# Patient Record
Sex: MALE | Race: BLACK OR AFRICAN AMERICAN | NOT HISPANIC OR LATINO | URBAN - METROPOLITAN AREA
[De-identification: names, ages, dates, MRNs, and addresses within clinical notes are randomized per-mention and may not be internally consistent; named-entity substitution may affect disease eponyms.]

---

## 2023-11-11 ENCOUNTER — EMERGENCY (EMERGENCY)
Facility: HOSPITAL | Age: 43
LOS: 1 days | Discharge: HOME | End: 2023-11-11
Attending: EMERGENCY MEDICINE | Admitting: EMERGENCY MEDICINE
Payer: COMMERCIAL

## 2023-11-11 VITALS
SYSTOLIC BLOOD PRESSURE: 140 MMHG | TEMPERATURE: 98 F | DIASTOLIC BLOOD PRESSURE: 101 MMHG | OXYGEN SATURATION: 100 % | HEART RATE: 98 BPM

## 2023-11-11 LAB
ALBUMIN SERPL ELPH-MCNC: 3.9 G/DL — SIGNIFICANT CHANGE UP (ref 3.3–5)
ALBUMIN SERPL ELPH-MCNC: 3.9 G/DL — SIGNIFICANT CHANGE UP (ref 3.3–5)
ALP SERPL-CCNC: 149 U/L — HIGH (ref 40–120)
ALP SERPL-CCNC: 149 U/L — HIGH (ref 40–120)
ALT FLD-CCNC: 14 U/L — SIGNIFICANT CHANGE UP (ref 4–41)
ALT FLD-CCNC: 14 U/L — SIGNIFICANT CHANGE UP (ref 4–41)
ANION GAP SERPL CALC-SCNC: 11 MMOL/L — SIGNIFICANT CHANGE UP (ref 7–14)
ANION GAP SERPL CALC-SCNC: 11 MMOL/L — SIGNIFICANT CHANGE UP (ref 7–14)
AST SERPL-CCNC: 20 U/L — SIGNIFICANT CHANGE UP (ref 4–40)
AST SERPL-CCNC: 20 U/L — SIGNIFICANT CHANGE UP (ref 4–40)
BASOPHILS # BLD AUTO: 0.02 K/UL — SIGNIFICANT CHANGE UP (ref 0–0.2)
BASOPHILS # BLD AUTO: 0.02 K/UL — SIGNIFICANT CHANGE UP (ref 0–0.2)
BASOPHILS NFR BLD AUTO: 0.3 % — SIGNIFICANT CHANGE UP (ref 0–2)
BASOPHILS NFR BLD AUTO: 0.3 % — SIGNIFICANT CHANGE UP (ref 0–2)
BILIRUB SERPL-MCNC: 0.8 MG/DL — SIGNIFICANT CHANGE UP (ref 0.2–1.2)
BILIRUB SERPL-MCNC: 0.8 MG/DL — SIGNIFICANT CHANGE UP (ref 0.2–1.2)
BUN SERPL-MCNC: 12 MG/DL — SIGNIFICANT CHANGE UP (ref 7–23)
BUN SERPL-MCNC: 12 MG/DL — SIGNIFICANT CHANGE UP (ref 7–23)
CALCIUM SERPL-MCNC: 9 MG/DL — SIGNIFICANT CHANGE UP (ref 8.4–10.5)
CALCIUM SERPL-MCNC: 9 MG/DL — SIGNIFICANT CHANGE UP (ref 8.4–10.5)
CHLORIDE SERPL-SCNC: 105 MMOL/L — SIGNIFICANT CHANGE UP (ref 98–107)
CHLORIDE SERPL-SCNC: 105 MMOL/L — SIGNIFICANT CHANGE UP (ref 98–107)
CO2 SERPL-SCNC: 23 MMOL/L — SIGNIFICANT CHANGE UP (ref 22–31)
CO2 SERPL-SCNC: 23 MMOL/L — SIGNIFICANT CHANGE UP (ref 22–31)
CREAT SERPL-MCNC: 0.92 MG/DL — SIGNIFICANT CHANGE UP (ref 0.5–1.3)
CREAT SERPL-MCNC: 0.92 MG/DL — SIGNIFICANT CHANGE UP (ref 0.5–1.3)
EGFR: 105 ML/MIN/1.73M2 — SIGNIFICANT CHANGE UP
EGFR: 105 ML/MIN/1.73M2 — SIGNIFICANT CHANGE UP
EOSINOPHIL # BLD AUTO: 0.06 K/UL — SIGNIFICANT CHANGE UP (ref 0–0.5)
EOSINOPHIL # BLD AUTO: 0.06 K/UL — SIGNIFICANT CHANGE UP (ref 0–0.5)
EOSINOPHIL NFR BLD AUTO: 0.9 % — SIGNIFICANT CHANGE UP (ref 0–6)
EOSINOPHIL NFR BLD AUTO: 0.9 % — SIGNIFICANT CHANGE UP (ref 0–6)
GLUCOSE SERPL-MCNC: 81 MG/DL — SIGNIFICANT CHANGE UP (ref 70–99)
GLUCOSE SERPL-MCNC: 81 MG/DL — SIGNIFICANT CHANGE UP (ref 70–99)
HCT VFR BLD CALC: 43.6 % — SIGNIFICANT CHANGE UP (ref 39–50)
HCT VFR BLD CALC: 43.6 % — SIGNIFICANT CHANGE UP (ref 39–50)
HGB BLD-MCNC: 13.4 G/DL — SIGNIFICANT CHANGE UP (ref 13–17)
HGB BLD-MCNC: 13.4 G/DL — SIGNIFICANT CHANGE UP (ref 13–17)
IANC: 4.91 K/UL — SIGNIFICANT CHANGE UP (ref 1.8–7.4)
IANC: 4.91 K/UL — SIGNIFICANT CHANGE UP (ref 1.8–7.4)
IMM GRANULOCYTES NFR BLD AUTO: 0.3 % — SIGNIFICANT CHANGE UP (ref 0–0.9)
IMM GRANULOCYTES NFR BLD AUTO: 0.3 % — SIGNIFICANT CHANGE UP (ref 0–0.9)
LYMPHOCYTES # BLD AUTO: 0.82 K/UL — LOW (ref 1–3.3)
LYMPHOCYTES # BLD AUTO: 0.82 K/UL — LOW (ref 1–3.3)
LYMPHOCYTES # BLD AUTO: 12.6 % — LOW (ref 13–44)
LYMPHOCYTES # BLD AUTO: 12.6 % — LOW (ref 13–44)
MCHC RBC-ENTMCNC: 24 PG — LOW (ref 27–34)
MCHC RBC-ENTMCNC: 24 PG — LOW (ref 27–34)
MCHC RBC-ENTMCNC: 30.7 GM/DL — LOW (ref 32–36)
MCHC RBC-ENTMCNC: 30.7 GM/DL — LOW (ref 32–36)
MCV RBC AUTO: 78.1 FL — LOW (ref 80–100)
MCV RBC AUTO: 78.1 FL — LOW (ref 80–100)
MONOCYTES # BLD AUTO: 0.69 K/UL — SIGNIFICANT CHANGE UP (ref 0–0.9)
MONOCYTES # BLD AUTO: 0.69 K/UL — SIGNIFICANT CHANGE UP (ref 0–0.9)
MONOCYTES NFR BLD AUTO: 10.6 % — SIGNIFICANT CHANGE UP (ref 2–14)
MONOCYTES NFR BLD AUTO: 10.6 % — SIGNIFICANT CHANGE UP (ref 2–14)
NEUTROPHILS # BLD AUTO: 4.91 K/UL — SIGNIFICANT CHANGE UP (ref 1.8–7.4)
NEUTROPHILS # BLD AUTO: 4.91 K/UL — SIGNIFICANT CHANGE UP (ref 1.8–7.4)
NEUTROPHILS NFR BLD AUTO: 75.3 % — SIGNIFICANT CHANGE UP (ref 43–77)
NEUTROPHILS NFR BLD AUTO: 75.3 % — SIGNIFICANT CHANGE UP (ref 43–77)
NRBC # BLD: 0 /100 WBCS — SIGNIFICANT CHANGE UP (ref 0–0)
NRBC # BLD: 0 /100 WBCS — SIGNIFICANT CHANGE UP (ref 0–0)
NRBC # FLD: 0 K/UL — SIGNIFICANT CHANGE UP (ref 0–0)
NRBC # FLD: 0 K/UL — SIGNIFICANT CHANGE UP (ref 0–0)
PLATELET # BLD AUTO: 237 K/UL — SIGNIFICANT CHANGE UP (ref 150–400)
PLATELET # BLD AUTO: 237 K/UL — SIGNIFICANT CHANGE UP (ref 150–400)
POTASSIUM SERPL-MCNC: 4.3 MMOL/L — SIGNIFICANT CHANGE UP (ref 3.5–5.3)
POTASSIUM SERPL-MCNC: 4.3 MMOL/L — SIGNIFICANT CHANGE UP (ref 3.5–5.3)
POTASSIUM SERPL-SCNC: 4.3 MMOL/L — SIGNIFICANT CHANGE UP (ref 3.5–5.3)
POTASSIUM SERPL-SCNC: 4.3 MMOL/L — SIGNIFICANT CHANGE UP (ref 3.5–5.3)
PROT SERPL-MCNC: 8.1 G/DL — SIGNIFICANT CHANGE UP (ref 6–8.3)
PROT SERPL-MCNC: 8.1 G/DL — SIGNIFICANT CHANGE UP (ref 6–8.3)
RBC # BLD: 5.58 M/UL — SIGNIFICANT CHANGE UP (ref 4.2–5.8)
RBC # BLD: 5.58 M/UL — SIGNIFICANT CHANGE UP (ref 4.2–5.8)
RBC # FLD: 17.2 % — HIGH (ref 10.3–14.5)
RBC # FLD: 17.2 % — HIGH (ref 10.3–14.5)
SODIUM SERPL-SCNC: 139 MMOL/L — SIGNIFICANT CHANGE UP (ref 135–145)
SODIUM SERPL-SCNC: 139 MMOL/L — SIGNIFICANT CHANGE UP (ref 135–145)
WBC # BLD: 6.52 K/UL — SIGNIFICANT CHANGE UP (ref 3.8–10.5)
WBC # BLD: 6.52 K/UL — SIGNIFICANT CHANGE UP (ref 3.8–10.5)
WBC # FLD AUTO: 6.52 K/UL — SIGNIFICANT CHANGE UP (ref 3.8–10.5)
WBC # FLD AUTO: 6.52 K/UL — SIGNIFICANT CHANGE UP (ref 3.8–10.5)

## 2023-11-11 PROCEDURE — 99284 EMERGENCY DEPT VISIT MOD MDM: CPT

## 2023-11-11 RX ORDER — LIDOCAINE HCL 20 MG/ML
5 VIAL (ML) INJECTION ONCE
Refills: 0 | Status: DISCONTINUED | OUTPATIENT
Start: 2023-11-11 | End: 2023-11-14

## 2023-11-11 NOTE — ED PROVIDER NOTE - PATIENT PORTAL LINK FT
You can access the FollowMyHealth Patient Portal offered by [facility] by registering at the following website: [email or web address]. By joining [facility] [facility]’s FollowMyHealth portal, you will also be able to view your health information using other applications (apps) compatible with our system.

## 2023-11-11 NOTE — ED ADULT NURSE NOTE - OBJECTIVE STATEMENT
Pt received to int9 , awake and alert, A&OX4, ambulatory. C/o rectal bleeding since last night. One episode of bloody stools this morning but states the other episodes were without passing stool while sitting on the toilet.  Respirations even and unlabored. Denies CP, SOB, N/V, HA, dizziness, palpitations, blurry vision. 20G IV placed to  L AC. Bed in lowest position, call bell within reach. Safety maintained.

## 2023-11-11 NOTE — ED PROVIDER NOTE - OBJECTIVE STATEMENT
43-year-old male PMH hydradenitis suppurativa presenting with rectal bleeding.  Patient states he had bloody stools on Friday at 10 PM Saturday 12 AM 1 AM 3 AM.  At 7:30 AM he had soft nonbloody stools.  Patient denies abdominal pain nausea vomiting sick contacts.  The patient states he has a history of GI bleeding a few months ago.  The patient has not seen anyone for the GI bleeding.  The patient has a history of colonoscopy 7 years ago he is unsure of the reason why he had a colonoscopy but it was recommended.  The patient also states he had pinkeye in October 25 which was diagnosed at urgent care and given antibiotics.  He then follow-up with an ophthalmologist who said he had eye irritation and switched his medications.  On Thursday he had eye pressure and took 2 Tylenols.  Repeated took 2 Tylenols on Friday which she thinks might of caused the GI bleeding.

## 2023-11-11 NOTE — ED ADULT TRIAGE NOTE - CHIEF COMPLAINT QUOTE
Pt c/o rectal bleed  with clots since last night.  Pt took  2 morris asp  2 days ago.  Pt dx with pink  eye on 11/25 , prescribed  eye drops with no relief, now c/o eye pressure.  Denies dizziness, rectal bleed

## 2023-11-11 NOTE — ED PROVIDER NOTE - ATTENDING CONTRIBUTION TO CARE
rectal bleeding could be 2/2 local oozing from hydradenitis internal hemorrhoids or higher source VSS could be higher source but colonoscopy  7 y ago negative so good prognostic sign will d/c with f/u GI and RTED PRN

## 2023-11-11 NOTE — ED PROVIDER NOTE - PHYSICAL EXAMINATION
GENERAL: NAD, lying in bed comfortably  HEAD:  Atraumatic, normocephalic  EYES: EOMI, PERRLA, conjunctiva and sclera clear  NECK: Supple, trachea midline, no JVD  HEART: Regular rate and rhythm, no murmurs, rubs, or gallops  LUNGS: Unlabored respirations.  Clear to auscultation bilaterally, no crackles, wheezing, or rhonchi  ABDOMEN: Soft, nontender, nondistended, +BS  Rectal exam: Chaperone Rafiq Yusuf and Bianca Acosta signs of active hydranitis and andrés blood on glove after rectal exam no palpable hemorrhoids   EXTREMITIES: 2+ peripheral pulses bilaterally. No clubbing, cyanosis, or edema  NERVOUS SYSTEM:  A&Ox3, moving all extremities, no focal deficits   SKIN: No rashes or lesions

## 2023-11-11 NOTE — ED PROVIDER NOTE - NSFOLLOWUPINSTRUCTIONS_ED_ALL_ED_FT
Rectal Bleeding    WHAT YOU NEED TO KNOW:    Rectal bleeding can be caused by constipation, hemorrhoids, or anal fissures. It may also be caused by polyps, tumors, or medical conditions, such as colitis or diverticulitis.    DISCHARGE INSTRUCTIONS:    Medicines:    Pain medicine: You may be given medicine to take away or decrease pain. Do not wait until the pain is severe before you take your medicine.    Iron supplement: Iron helps your body make more red blood cells.    Steroids: This medicine decreases inflammation in your rectum. It may be applied as a cream, ointment, or lotion.    Take your medicine as directed. Contact your healthcare provider if you think your medicine is not helping or if you have side effects. Tell him or her if you are allergic to any medicine. Keep a list of the medicines, vitamins, and herbs you take. Include the amounts, and when and why you take them. Bring the list or the pill bottles to follow-up visits. Carry your medicine list with you in case of an emergency.  Follow up with your healthcare provider as directed: Write down your questions so you remember to ask them during your visits.    Drink liquids as directed: Ask your healthcare provider how much liquid to drink each day and which liquids are best for you. This will help prevent dehydration and constipation.    Contact your healthcare provider if:    You have a fever.    Your rectal bleeding stopped for a time, but has started again.    You have nausea.    You have cold, sweaty, pale skin.    You have changes in your bowel movements, such as diarrhea.    You have questions or concerns about your condition or care.  Seek care immediately or call 911 if:    You are breathing faster than usual.    You are dizzy, lightheaded, or feel faint.    You are confused or cannot think clearly.    You urinate less than usual or not at all.    Your rectal bleeding is constant or heavy.    You have severe abdominal pain or cramping.

## 2024-03-02 ENCOUNTER — INPATIENT (INPATIENT)
Facility: HOSPITAL | Age: 44
LOS: 2 days | Discharge: HOME | End: 2024-03-05
Attending: STUDENT IN AN ORGANIZED HEALTH CARE EDUCATION/TRAINING PROGRAM | Admitting: STUDENT IN AN ORGANIZED HEALTH CARE EDUCATION/TRAINING PROGRAM
Payer: COMMERCIAL

## 2024-03-02 VITALS
TEMPERATURE: 98 F | OXYGEN SATURATION: 100 % | DIASTOLIC BLOOD PRESSURE: 80 MMHG | HEART RATE: 108 BPM | RESPIRATION RATE: 18 BRPM | SYSTOLIC BLOOD PRESSURE: 114 MMHG

## 2024-03-02 DIAGNOSIS — K92.2 GASTROINTESTINAL HEMORRHAGE, UNSPECIFIED: ICD-10-CM

## 2024-03-02 PROBLEM — L73.2 HIDRADENITIS SUPPURATIVA: Chronic | Status: ACTIVE | Noted: 2023-11-11

## 2024-03-02 LAB
ALBUMIN SERPL ELPH-MCNC: 3.3 G/DL — SIGNIFICANT CHANGE UP (ref 3.3–5)
ALP SERPL-CCNC: 129 U/L — HIGH (ref 40–120)
ALT FLD-CCNC: 19 U/L — SIGNIFICANT CHANGE UP (ref 4–41)
ANION GAP SERPL CALC-SCNC: 8 MMOL/L — SIGNIFICANT CHANGE UP (ref 7–14)
APTT BLD: 29.3 SEC — SIGNIFICANT CHANGE UP (ref 24.5–35.6)
AST SERPL-CCNC: 17 U/L — SIGNIFICANT CHANGE UP (ref 4–40)
BASE EXCESS BLDV CALC-SCNC: 1 MMOL/L — SIGNIFICANT CHANGE UP (ref -2–3)
BASOPHILS # BLD AUTO: 0.01 K/UL — SIGNIFICANT CHANGE UP (ref 0–0.2)
BASOPHILS NFR BLD AUTO: 0.1 % — SIGNIFICANT CHANGE UP (ref 0–2)
BILIRUB SERPL-MCNC: 0.3 MG/DL — SIGNIFICANT CHANGE UP (ref 0.2–1.2)
BLD GP AB SCN SERPL QL: NEGATIVE — SIGNIFICANT CHANGE UP
BLOOD GAS VENOUS COMPREHENSIVE RESULT: SIGNIFICANT CHANGE UP
BUN SERPL-MCNC: 12 MG/DL — SIGNIFICANT CHANGE UP (ref 7–23)
CALCIUM SERPL-MCNC: 8.6 MG/DL — SIGNIFICANT CHANGE UP (ref 8.4–10.5)
CHLORIDE BLDV-SCNC: 107 MMOL/L — SIGNIFICANT CHANGE UP (ref 96–108)
CHLORIDE SERPL-SCNC: 106 MMOL/L — SIGNIFICANT CHANGE UP (ref 98–107)
CO2 BLDV-SCNC: 28 MMOL/L — HIGH (ref 22–26)
CO2 SERPL-SCNC: 24 MMOL/L — SIGNIFICANT CHANGE UP (ref 22–31)
CREAT SERPL-MCNC: 0.89 MG/DL — SIGNIFICANT CHANGE UP (ref 0.5–1.3)
EGFR: 108 ML/MIN/1.73M2 — SIGNIFICANT CHANGE UP
EOSINOPHIL # BLD AUTO: 0.06 K/UL — SIGNIFICANT CHANGE UP (ref 0–0.5)
EOSINOPHIL NFR BLD AUTO: 0.8 % — SIGNIFICANT CHANGE UP (ref 0–6)
GAS PNL BLDV: 137 MMOL/L — SIGNIFICANT CHANGE UP (ref 136–145)
GLUCOSE BLDV-MCNC: 85 MG/DL — SIGNIFICANT CHANGE UP (ref 70–99)
GLUCOSE SERPL-MCNC: 84 MG/DL — SIGNIFICANT CHANGE UP (ref 70–99)
HCO3 BLDV-SCNC: 27 MMOL/L — SIGNIFICANT CHANGE UP (ref 22–29)
HCT VFR BLD CALC: 31.5 % — LOW (ref 39–50)
HCT VFR BLD CALC: 31.6 % — LOW (ref 39–50)
HCT VFR BLDA CALC: 31 % — LOW (ref 39–51)
HGB BLD CALC-MCNC: 10.2 G/DL — LOW (ref 12.6–17.4)
HGB BLD-MCNC: 9.4 G/DL — LOW (ref 13–17)
HGB BLD-MCNC: 9.6 G/DL — LOW (ref 13–17)
HIV 1+2 AB+HIV1 P24 AG SERPL QL IA: SIGNIFICANT CHANGE UP
IANC: 5.79 K/UL — SIGNIFICANT CHANGE UP (ref 1.8–7.4)
IMM GRANULOCYTES NFR BLD AUTO: 0.3 % — SIGNIFICANT CHANGE UP (ref 0–0.9)
INR BLD: 1.33 RATIO — HIGH (ref 0.85–1.18)
LACTATE BLDV-MCNC: 0.9 MMOL/L — SIGNIFICANT CHANGE UP (ref 0.5–2)
LYMPHOCYTES # BLD AUTO: 0.95 K/UL — LOW (ref 1–3.3)
LYMPHOCYTES # BLD AUTO: 12.5 % — LOW (ref 13–44)
MCHC RBC-ENTMCNC: 21.4 PG — LOW (ref 27–34)
MCHC RBC-ENTMCNC: 21.5 PG — LOW (ref 27–34)
MCHC RBC-ENTMCNC: 29.8 GM/DL — LOW (ref 32–36)
MCHC RBC-ENTMCNC: 30.4 GM/DL — LOW (ref 32–36)
MCV RBC AUTO: 70.4 FL — LOW (ref 80–100)
MCV RBC AUTO: 71.9 FL — LOW (ref 80–100)
MONOCYTES # BLD AUTO: 0.75 K/UL — SIGNIFICANT CHANGE UP (ref 0–0.9)
MONOCYTES NFR BLD AUTO: 9.9 % — SIGNIFICANT CHANGE UP (ref 2–14)
NEUTROPHILS # BLD AUTO: 5.79 K/UL — SIGNIFICANT CHANGE UP (ref 1.8–7.4)
NEUTROPHILS NFR BLD AUTO: 76.4 % — SIGNIFICANT CHANGE UP (ref 43–77)
NRBC # BLD: 0 /100 WBCS — SIGNIFICANT CHANGE UP (ref 0–0)
NRBC # BLD: 0 /100 WBCS — SIGNIFICANT CHANGE UP (ref 0–0)
NRBC # FLD: 0 K/UL — SIGNIFICANT CHANGE UP (ref 0–0)
NRBC # FLD: 0 K/UL — SIGNIFICANT CHANGE UP (ref 0–0)
PCO2 BLDV: 46 MMHG — SIGNIFICANT CHANGE UP (ref 42–55)
PH BLDV: 7.37 — SIGNIFICANT CHANGE UP (ref 7.32–7.43)
PLATELET # BLD AUTO: 341 K/UL — SIGNIFICANT CHANGE UP (ref 150–400)
PLATELET # BLD AUTO: 345 K/UL — SIGNIFICANT CHANGE UP (ref 150–400)
PO2 BLDV: 32 MMHG — SIGNIFICANT CHANGE UP (ref 25–45)
POTASSIUM BLDV-SCNC: 4.4 MMOL/L — SIGNIFICANT CHANGE UP (ref 3.5–5.1)
POTASSIUM SERPL-MCNC: 4.4 MMOL/L — SIGNIFICANT CHANGE UP (ref 3.5–5.3)
POTASSIUM SERPL-SCNC: 4.4 MMOL/L — SIGNIFICANT CHANGE UP (ref 3.5–5.3)
PROT SERPL-MCNC: 7.7 G/DL — SIGNIFICANT CHANGE UP (ref 6–8.3)
PROTHROM AB SERPL-ACNC: 14.9 SEC — HIGH (ref 9.5–13)
RBC # BLD: 4.38 M/UL — SIGNIFICANT CHANGE UP (ref 4.2–5.8)
RBC # BLD: 4.49 M/UL — SIGNIFICANT CHANGE UP (ref 4.2–5.8)
RBC # FLD: 16.9 % — HIGH (ref 10.3–14.5)
RBC # FLD: 16.9 % — HIGH (ref 10.3–14.5)
RH IG SCN BLD-IMP: POSITIVE — SIGNIFICANT CHANGE UP
RH IG SCN BLD-IMP: POSITIVE — SIGNIFICANT CHANGE UP
SAO2 % BLDV: 50.2 % — LOW (ref 67–88)
SODIUM SERPL-SCNC: 138 MMOL/L — SIGNIFICANT CHANGE UP (ref 135–145)
WBC # BLD: 7.45 K/UL — SIGNIFICANT CHANGE UP (ref 3.8–10.5)
WBC # BLD: 7.58 K/UL — SIGNIFICANT CHANGE UP (ref 3.8–10.5)
WBC # FLD AUTO: 7.45 K/UL — SIGNIFICANT CHANGE UP (ref 3.8–10.5)
WBC # FLD AUTO: 7.58 K/UL — SIGNIFICANT CHANGE UP (ref 3.8–10.5)

## 2024-03-02 PROCEDURE — 99291 CRITICAL CARE FIRST HOUR: CPT

## 2024-03-02 PROCEDURE — 74177 CT ABD & PELVIS W/CONTRAST: CPT | Mod: 26,MC

## 2024-03-02 NOTE — ED PROVIDER NOTE - ATTENDING CONTRIBUTION TO CARE
I have personally performed a face to face medical and diagnostic evaluation of the patient. I have discussed with and reviewed the Resident's note and agree with the History, ROS, Physical Exam and MDM unless otherwise indicated. A brief summary of my personal evaluation and impression can be found below.    Pia QUINTANILLA:   43-year-old male history of hidradenitis, presents with a chief complaint of rectal bleeding, patient reports about 4-5 episodes today of bright red blood per rectum with fullness sensation to his lower abdomen just before he defecates.  Reports had a similar episode of this in November, was seen in the ER was discharged, was instructed to follow-up with GI outpatient was unable to do so.  Patient reports feeling mildly lightheaded and dizzy and a little weak.   has not had interval bleeding since being seen in November.  Can move everything and feel everything.  Denies bleeding elsewhere.  No chest pain no trouble breathing no nausea vomiting.  No abdominal pain.    All other ROS negative, except as above and as per HPI and ROS section.    VITALS: Initial triage and subsequent vitals have been reviewed by me.  GEN APPEARANCE: Alert, non-toxic, well-appearing, NAD.  HEAD: Atraumatic.  EYES: PERRLa, EOMI, vision grossly intact.   NECK: Supple  CV: RRR, S1S2, no c/r/m/g. Cap refill <2 seconds. No bruits.   LUNGS: CTAB. No abnormal breath sounds.  ABDOMEN: Soft, NTND. No guarding or rebound.   MSK/EXT: No spinal or extremity point tenderness. No CVA ttp. Pelvis stable. No peripheral edema.  NEURO: Alert, follows commands. Weight bearing normal. Speech normal. Sensation and motor normal x4 extremities.   SKIN: Warm, dry and intact. No rash.  PSYCH: Appropriate    Plan/MDM: Exam vitals are stable nontoxic-appearing physical exam as above, DDx and CT rectal exam documented as resident as above, concern for possible diverticulitis versus possible colon/rectal CA as etiology of bleeding, no obvious hemorrhoids on exam, may also be sequela of undertreated STD, will check basic labs type and screen STD screening urine CT abdomen pelvis give meds as needed and reassess, will transfuse as indicated, consider GI consult as indicated, consider possible admission.

## 2024-03-02 NOTE — ED PROVIDER NOTE - PHYSICAL EXAMINATION
PHYSICAL EXAM:  CONSTITUTIONAL: No acute distress  HEAD: Atraumatic  EYES: conjunctival pallor  ENMT: Airway patent, Nasal mucosa clear. Mouth with normal mucosa. Uvula is midline.   CARDIAC:  mild tachycardia without appreciable murmur  RESPIRATORY: Breathing unlabored. Breath sounds clear and equal bilaterally.  ABDOMEN: Soft, nontender, nondistended. No rebound tenderness or guarding.\  Rectal: no blood on glove or active bleeding noted,  Circumferential firm mass,  no external hemorrhoids visualized no internal hemorrhoids palpated  NEUROLOGICAL: Alert and oriented, no focal deficits, no motor or sensory deficits. CN2-12 intact. Sensation intact x4 extremities.

## 2024-03-02 NOTE — PATIENT PROFILE ADULT - FALL HARM RISK - HARM RISK INTERVENTIONS
Assistance with ambulation/Assistance OOB with selected safe patient handling equipment/Communicate Risk of Fall with Harm to all staff/Reinforce activity limits and safety measures with patient and family/Review medications for side effects contributing to fall risk/Sit up slowly, dangle for a short time, stand at bedside before walking/Tailored Fall Risk Interventions/Toileting schedule using arm’s reach rule for commode and bathroom/Visual Cue: Yellow wristband and red socks/Bed in lowest position, wheels locked, appropriate side rails in place/Call bell, personal items and telephone in reach/Instruct patient to call for assistance before getting out of bed or chair/Non-slip footwear when patient is out of bed/[place] to call system/Physically safe environment - no spills, clutter or unnecessary equipment/Purposeful Proactive Rounding/Room/bathroom lighting operational, light cord in reach

## 2024-03-02 NOTE — ED ADULT TRIAGE NOTE - CHIEF COMPLAINT QUOTE
Patient presents to ED for episodes of rectal bleed since 2 AM last night. Last episode 3 hours ago. Per patient, bright red with clots. He reports being seen for similar episode November 2023 and diagnosed with hemorrhoids. Denies CP, SOB, N/V, fevers, chills. No significant past medical history.

## 2024-03-02 NOTE — PATIENT PROFILE ADULT - FUNCTIONAL SCREEN CURRENT LEVEL: COMMUNICATION, MLM
0 = understands/communicates without difficulty Bactrim Counseling:  I discussed with the patient the risks of sulfa antibiotics including but not limited to GI upset, allergic reaction, drug rash, diarrhea, dizziness, photosensitivity, and yeast infections.  Rarely, more serious reactions can occur including but not limited to aplastic anemia, agranulocytosis, methemoglobinemia, blood dyscrasias, liver or kidney failure, lung infiltrates or desquamative/blistering drug rashes.

## 2024-03-02 NOTE — ED PROVIDER NOTE - OBJECTIVE STATEMENT
43-year-old male with history of hidradenitis suppurativa presenting with concern for rectal bleeding.  Patient notes that approximately 1 year prior he was seen for similar rectal bleeding and was diagnosed with hemorrhoids at that time.  It was self resolved.   States that beginning around 2 AM last night he has had about 5 episodes of very large volume bright red blood per rectum and passage of clots.  Patient denies any pain lightheadedness dizziness chest pain shortness of breath.  Is not on any blood thinners or antiplatelet agents.

## 2024-03-02 NOTE — CONSULT NOTE ADULT - SUBJECTIVE AND OBJECTIVE BOX
SURGERY CONSULT NOTE    HPI: 42 YO M hx of HS p/w 1 day of bloody BM. States that he saw clots of blood in his BM this AM. Reports episodes of bloody BM and hemorrhoids earlier in 2023. Denies dizziness SOB nausea or emesis. States last colonoscopy might have been when he was 29 but does not remember why or what the results were. Surgery consulted for GI bleed and noted active extravasation noted on CT scan. Noted gluteal mass vs collection on CT scan.       PAST MEDICAL & SURGICAL HISTORY:  Hydradenitis          MEDICATIONS  (STANDING):    MEDICATIONS  (PRN):      Allergies    No Known Allergies    Intolerances        SOCIAL HISTORY:    FAMILY HISTORY:      Physical Exam:  General: NAD, resting comfortably  HEENT: NC/AT, EOMI, normal hearing, no oral lesions, no LAD, neck supple  Pulmonary: normal resp effort, CTA-B  Cardiovascular: NSR, no murmurs  Abdominal: soft, ND/NT, no organomegaly  gluteus with HS and prior scarring    Vital Signs Last 24 Hrs  T(C): 37.1 (02 Mar 2024 21:40), Max: 37.1 (02 Mar 2024 21:40)  T(F): 98.8 (02 Mar 2024 21:40), Max: 98.8 (02 Mar 2024 21:40)  HR: 91 (02 Mar 2024 21:40) (91 - 108)  BP: 117/70 (02 Mar 2024 21:40) (114/80 - 117/70)  BP(mean): --  RR: 17 (02 Mar 2024 21:40) (17 - 18)  SpO2: 100% (02 Mar 2024 21:40) (100% - 100%)    Parameters below as of 02 Mar 2024 21:40  Patient On (Oxygen Delivery Method): room air        I&O's Summary          LABS:                        9.4    7.45  )-----------( 341      ( 02 Mar 2024 20:46 )             31.5     03-02    138  |  106  |  12  ----------------------------<  84  4.4   |  24  |  0.89    Ca    8.6      02 Mar 2024 17:30    TPro  7.7  /  Alb  3.3  /  TBili  0.3  /  DBili  x   /  AST  17  /  ALT  19  /  AlkPhos  129<H>  03-02    PT/INR - ( 02 Mar 2024 17:30 )   PT: 14.9 sec;   INR: 1.33 ratio         PTT - ( 02 Mar 2024 17:30 )  PTT:29.3 sec  Urinalysis Basic - ( 02 Mar 2024 17:30 )    Color: x / Appearance: x / SG: x / pH: x  Gluc: 84 mg/dL / Ketone: x  / Bili: x / Urobili: x   Blood: x / Protein: x / Nitrite: x   Leuk Esterase: x / RBC: x / WBC x   Sq Epi: x / Non Sq Epi: x / Bacteria: x      CAPILLARY BLOOD GLUCOSE        LIVER FUNCTIONS - ( 02 Mar 2024 17:30 )  Alb: 3.3 g/dL / Pro: 7.7 g/dL / ALK PHOS: 129 U/L / ALT: 19 U/L / AST: 17 U/L / GGT: x             Cultures:      RADIOLOGY & ADDITIONAL STUDIES:  < from: CT Abdomen and Pelvis w/ IV Cont (03.02.24 @ 18:50) >  BOWEL: Evaluation for active bleed is somewhat limited due to technical   error resulting in no precontrast imaging. There is an active bleed   within the mid descending colon and there is surrounding fat stranding as   well as apparent concentration within the medial wall. There is is an   irregular area of masslike enhancement measuring approximately5.2 x 5.4   x 8.7 cm within the left medial gluteal fold (image #117, series   303/axial images and image #80, series 608/sagittal images) extending to   the posterior aspect of the internal sphincter. No internal fluid is   appreciated. There also appears to be a tract within the right medial   gluteal fat that also extends anteriorly toward the sphincter complex   with an apparent drainage track within the superficial medial gluteal   fold (image #109, series 303). Appendix is normal.  PERITONEUM: No ascites.  VESSELS: Within normal limits.  RETROPERITONEUM/LYMPH NODES: No lymphadenopathy.  ABDOMINAL WALL: See above for valvular abnormalities. Otherwise   unremarkable.  BONES: Within normal limits.    IMPRESSION:  Active congestive distal bleed within the mid descending colon with   surrounding fat stranding and there appears to be concentration of   contrast within the medial wall. Further evaluation with colonoscopy is   recommended exclude underlying lesion.    There appears to be a enhancing mass versus collapsed collection within   the left medial gluteal fold that appears to extend to the posterior   aspect of the internal sphincter as well as an apparent tract within the   right medial gluteal fold. This could represent a mass, marked   hidradenitis/enhancing complex perianal/gluteal fistula. Further   evaluation with perianal fistula protocol MRI is recommended.    < end of copied text >        Plan:  42 YO M hx of HS and hemorrhoids p/w 1 day of bloody BM. States that he saw clots of blood in his BM this AM. Reports episodes of bloody BM and hemorrhoids earlier in 2023. Denies dizziness SOB nausea or emesis. States last colonoscopy might have been when he was 29 but does not remember why or what the results were. Surgery consulted for GI bleed and noted active extravasation noted on CT scan at mid descending colon. Noted gluteal mass vs collection as well. Hemodynamically stable at time of encounter with stable HgB 9.4.     - recommend IR consult for possible embolization in setting of active bleed in mid-descending colon  - "gluteal mass" likely secondary to scarring from HS, no induration or WBC suggesting abscess  - recommend GI consult for colonoscopy  - serial CBC and abdominal exam  - transfuse PRN  - no acute surgical intervention at this time, likely more beneficial to attempt more minimally invasive approaches  - surgery to follow    boubacar Florian, surgery attending    B Team, [id number]

## 2024-03-02 NOTE — ED ADULT NURSE NOTE - OBJECTIVE STATEMENT
Pt received to int 2 , awake and alert, A&OX4, ambulatory. C/o rectal bleeding since this morning around 2am with bowel movements only. Denies anticoagulation use, abd pain.  Respirations even and unlabored. Denies CP, SOB, N/V, HA, dizziness, palpitations, blurry vision. 20G IV placed to  R AC Bed in lowest position, call bell within reach. Safety maintained.

## 2024-03-02 NOTE — ED PROVIDER NOTE - CLINICAL SUMMARY MEDICAL DECISION MAKING FREE TEXT BOX
43-year-old male with history of hidradenitis suppurativa presenting with brbpr. Vitals with tachycardai. Exam with concerning rectal mass but without active hemorrhage.  will obtain basics coags type and screen CT abdomen pelvis to evaluate mass and possible active bleed reassess.  May require admission.

## 2024-03-03 ENCOUNTER — TRANSCRIPTION ENCOUNTER (OUTPATIENT)
Age: 44
End: 2024-03-03

## 2024-03-03 DIAGNOSIS — K60.3 ANAL FISTULA: ICD-10-CM

## 2024-03-03 DIAGNOSIS — K62.5 HEMORRHAGE OF ANUS AND RECTUM: ICD-10-CM

## 2024-03-03 DIAGNOSIS — R71.8 OTHER ABNORMALITY OF RED BLOOD CELLS: ICD-10-CM

## 2024-03-03 DIAGNOSIS — Z29.9 ENCOUNTER FOR PROPHYLACTIC MEASURES, UNSPECIFIED: ICD-10-CM

## 2024-03-03 LAB
ADD ON TEST-SPECIMEN IN LAB: SIGNIFICANT CHANGE UP
ANION GAP SERPL CALC-SCNC: 9 MMOL/L — SIGNIFICANT CHANGE UP (ref 7–14)
APTT BLD: 30.7 SEC — SIGNIFICANT CHANGE UP (ref 24.5–35.6)
BLD GP AB SCN SERPL QL: NEGATIVE — SIGNIFICANT CHANGE UP
BUN SERPL-MCNC: 9 MG/DL — SIGNIFICANT CHANGE UP (ref 7–23)
CALCIUM SERPL-MCNC: 8.2 MG/DL — LOW (ref 8.4–10.5)
CHLORIDE SERPL-SCNC: 107 MMOL/L — SIGNIFICANT CHANGE UP (ref 98–107)
CO2 SERPL-SCNC: 24 MMOL/L — SIGNIFICANT CHANGE UP (ref 22–31)
CREAT SERPL-MCNC: 0.81 MG/DL — SIGNIFICANT CHANGE UP (ref 0.5–1.3)
EGFR: 111 ML/MIN/1.73M2 — SIGNIFICANT CHANGE UP
FERRITIN SERPL-MCNC: 88 NG/ML — SIGNIFICANT CHANGE UP (ref 30–400)
GLUCOSE SERPL-MCNC: 93 MG/DL — SIGNIFICANT CHANGE UP (ref 70–99)
HCT VFR BLD CALC: 28.9 % — LOW (ref 39–50)
HCT VFR BLD CALC: 29.5 % — LOW (ref 39–50)
HCT VFR BLD CALC: 31.3 % — LOW (ref 39–50)
HGB BLD-MCNC: 9.1 G/DL — LOW (ref 13–17)
HGB BLD-MCNC: 9.4 G/DL — LOW (ref 13–17)
HGB BLD-MCNC: 9.6 G/DL — LOW (ref 13–17)
INR BLD: 1.27 RATIO — HIGH (ref 0.85–1.18)
IRON SATN MFR SERPL: 13 UG/DL — LOW (ref 45–165)
IRON SATN MFR SERPL: 6 % — LOW (ref 14–50)
MAGNESIUM SERPL-MCNC: 2.2 MG/DL — SIGNIFICANT CHANGE UP (ref 1.6–2.6)
MCHC RBC-ENTMCNC: 21.9 PG — LOW (ref 27–34)
MCHC RBC-ENTMCNC: 22.3 PG — LOW (ref 27–34)
MCHC RBC-ENTMCNC: 22.5 PG — LOW (ref 27–34)
MCHC RBC-ENTMCNC: 30.7 GM/DL — LOW (ref 32–36)
MCHC RBC-ENTMCNC: 31.5 GM/DL — LOW (ref 32–36)
MCHC RBC-ENTMCNC: 31.9 GM/DL — LOW (ref 32–36)
MCV RBC AUTO: 70.6 FL — LOW (ref 80–100)
MCV RBC AUTO: 70.8 FL — LOW (ref 80–100)
MCV RBC AUTO: 71.3 FL — LOW (ref 80–100)
NRBC # BLD: 0 /100 WBCS — SIGNIFICANT CHANGE UP (ref 0–0)
NRBC # FLD: 0 K/UL — SIGNIFICANT CHANGE UP (ref 0–0)
PHOSPHATE SERPL-MCNC: 3 MG/DL — SIGNIFICANT CHANGE UP (ref 2.5–4.5)
PLATELET # BLD AUTO: 278 K/UL — SIGNIFICANT CHANGE UP (ref 150–400)
PLATELET # BLD AUTO: 284 K/UL — SIGNIFICANT CHANGE UP (ref 150–400)
PLATELET # BLD AUTO: 288 K/UL — SIGNIFICANT CHANGE UP (ref 150–400)
POTASSIUM SERPL-MCNC: 3.9 MMOL/L — SIGNIFICANT CHANGE UP (ref 3.5–5.3)
POTASSIUM SERPL-SCNC: 3.9 MMOL/L — SIGNIFICANT CHANGE UP (ref 3.5–5.3)
PROTHROM AB SERPL-ACNC: 14.1 SEC — HIGH (ref 9.5–13)
RBC # BLD: 4.08 M/UL — LOW (ref 4.2–5.8)
RBC # BLD: 4.18 M/UL — LOW (ref 4.2–5.8)
RBC # BLD: 4.39 M/UL — SIGNIFICANT CHANGE UP (ref 4.2–5.8)
RBC # FLD: 16.8 % — HIGH (ref 10.3–14.5)
RBC # FLD: 16.8 % — HIGH (ref 10.3–14.5)
RBC # FLD: 17 % — HIGH (ref 10.3–14.5)
RH IG SCN BLD-IMP: POSITIVE — SIGNIFICANT CHANGE UP
SODIUM SERPL-SCNC: 140 MMOL/L — SIGNIFICANT CHANGE UP (ref 135–145)
T PALLIDUM AB TITR SER: NEGATIVE — SIGNIFICANT CHANGE UP
TIBC SERPL-MCNC: 233 UG/DL — SIGNIFICANT CHANGE UP (ref 220–430)
UIBC SERPL-MCNC: 220 UG/DL — SIGNIFICANT CHANGE UP (ref 110–370)
WBC # BLD: 5.29 K/UL — SIGNIFICANT CHANGE UP (ref 3.8–10.5)
WBC # BLD: 5.64 K/UL — SIGNIFICANT CHANGE UP (ref 3.8–10.5)
WBC # BLD: 5.97 K/UL — SIGNIFICANT CHANGE UP (ref 3.8–10.5)
WBC # FLD AUTO: 5.29 K/UL — SIGNIFICANT CHANGE UP (ref 3.8–10.5)
WBC # FLD AUTO: 5.64 K/UL — SIGNIFICANT CHANGE UP (ref 3.8–10.5)
WBC # FLD AUTO: 5.97 K/UL — SIGNIFICANT CHANGE UP (ref 3.8–10.5)

## 2024-03-03 PROCEDURE — 99223 1ST HOSP IP/OBS HIGH 75: CPT

## 2024-03-03 PROCEDURE — 99223 1ST HOSP IP/OBS HIGH 75: CPT | Mod: GC

## 2024-03-03 RX ORDER — ONDANSETRON 8 MG/1
4 TABLET, FILM COATED ORAL EVERY 8 HOURS
Refills: 0 | Status: DISCONTINUED | OUTPATIENT
Start: 2024-03-03 | End: 2024-03-05

## 2024-03-03 RX ORDER — ACETAMINOPHEN 500 MG
650 TABLET ORAL EVERY 6 HOURS
Refills: 0 | Status: DISCONTINUED | OUTPATIENT
Start: 2024-03-03 | End: 2024-03-05

## 2024-03-03 RX ORDER — SOD SULF/SODIUM/NAHCO3/KCL/PEG
4000 SOLUTION, RECONSTITUTED, ORAL ORAL ONCE
Refills: 0 | Status: COMPLETED | OUTPATIENT
Start: 2024-03-03 | End: 2024-03-03

## 2024-03-03 RX ORDER — PANTOPRAZOLE SODIUM 20 MG/1
80 TABLET, DELAYED RELEASE ORAL ONCE
Refills: 0 | Status: COMPLETED | OUTPATIENT
Start: 2024-03-03 | End: 2024-03-03

## 2024-03-03 RX ORDER — LANOLIN ALCOHOL/MO/W.PET/CERES
3 CREAM (GRAM) TOPICAL AT BEDTIME
Refills: 0 | Status: DISCONTINUED | OUTPATIENT
Start: 2024-03-03 | End: 2024-03-05

## 2024-03-03 RX ORDER — PANTOPRAZOLE SODIUM 20 MG/1
40 TABLET, DELAYED RELEASE ORAL EVERY 12 HOURS
Refills: 0 | Status: DISCONTINUED | OUTPATIENT
Start: 2024-03-03 | End: 2024-03-04

## 2024-03-03 RX ORDER — IRON SUCROSE 20 MG/ML
300 INJECTION, SOLUTION INTRAVENOUS EVERY 24 HOURS
Refills: 0 | Status: DISCONTINUED | OUTPATIENT
Start: 2024-03-03 | End: 2024-03-05

## 2024-03-03 RX ORDER — SOD SULF/SODIUM/NAHCO3/KCL/PEG
4000 SOLUTION, RECONSTITUTED, ORAL ORAL ONCE
Refills: 0 | Status: DISCONTINUED | OUTPATIENT
Start: 2024-03-03 | End: 2024-03-03

## 2024-03-03 RX ORDER — SODIUM CHLORIDE 9 MG/ML
1000 INJECTION INTRAMUSCULAR; INTRAVENOUS; SUBCUTANEOUS
Refills: 0 | Status: DISCONTINUED | OUTPATIENT
Start: 2024-03-03 | End: 2024-03-04

## 2024-03-03 RX ADMIN — Medication 4000 MILLILITER(S): at 18:11

## 2024-03-03 RX ADMIN — SODIUM CHLORIDE 100 MILLILITER(S): 9 INJECTION INTRAMUSCULAR; INTRAVENOUS; SUBCUTANEOUS at 02:15

## 2024-03-03 RX ADMIN — PANTOPRAZOLE SODIUM 80 MILLIGRAM(S): 20 TABLET, DELAYED RELEASE ORAL at 06:26

## 2024-03-03 RX ADMIN — SODIUM CHLORIDE 100 MILLILITER(S): 9 INJECTION INTRAMUSCULAR; INTRAVENOUS; SUBCUTANEOUS at 18:11

## 2024-03-03 RX ADMIN — PANTOPRAZOLE SODIUM 40 MILLIGRAM(S): 20 TABLET, DELAYED RELEASE ORAL at 18:20

## 2024-03-03 RX ADMIN — IRON SUCROSE 176.67 MILLIGRAM(S): 20 INJECTION, SOLUTION INTRAVENOUS at 18:11

## 2024-03-03 NOTE — DISCHARGE NOTE PROVIDER - NSDCCPCAREPLAN_GEN_ALL_CORE_FT
PRINCIPAL DISCHARGE DIAGNOSIS  Diagnosis: Lower GI bleed  Assessment and Plan of Treatment: You came in with rectal bleeding and was your hemoglobin was noted to be lower than what it used to be previously. You received a unit of blood and your blood pressure remained stable. CT scan showed evidence of active bleed. Gastroenterology team was consulted and you underwent a colonoscopy which showed ---- Your blood count was closely monitored and it remained stable as well. Please follow-up with the gastroenretology team after discharge for close monitoring.      SECONDARY DISCHARGE DIAGNOSES  Diagnosis: Perianal fistula  Assessment and Plan of Treatment: You were noted to have a collection in the buttock region that was suspicious for an abnormal collection between the gastrointestinal tract and the muscles called a fistula. You underwent an MRI which showed ----     PRINCIPAL DISCHARGE DIAGNOSIS  Diagnosis: Lower GI bleed  Assessment and Plan of Treatment: You came in with rectal bleeding and was your hemoglobin was noted to be lower than what it used to be previously. You received a unit of blood and your blood pressure remained stable. CT scan showed evidence of active bleed. Gastroenterology team was consulted and you underwent a colonoscopy which showed a mass in the descending colon. Small amounts of tissue was taken from this mass and sent to the lab for analysis. You should follow up with your PCP and a GI doctor for the results of that test. Your blood count was closely monitored and it remained stable as well. Your bleeding resolved in the hospital as well. Please follow-up with the gastroenretology team after discharge for close monitoring.      SECONDARY DISCHARGE DIAGNOSES  Diagnosis: Perianal fistula  Assessment and Plan of Treatment: You were noted to have a collection in the buttock region that was suspicious for an abnormal collection between the gastrointestinal tract and the muscles called a fistula. You underwent an MRI which showed ----

## 2024-03-03 NOTE — PROGRESS NOTE ADULT - PROBLEM SELECTOR PLAN 4
DVT- held i/s/o active bleed   GI -not indicate  Diet- NPO, advance as per GI orders  Dispo_ pending clinical course

## 2024-03-03 NOTE — PROGRESS NOTE ADULT - PROBLEM SELECTOR PLAN 1
-Diverticulitis vs IBD vs. colorectal ca vs infectious   -Hx rectal bleed 1 y ago  due to hemorrhoids which self resolved.  Another episode in Nov  and was referred to GI but was unable to f/u. No external hemorrhoids on exam.  -CTAP 3/2 showed descending colon active bleed w/extravasation, c/f underlying mass, recommend further evaluation with colonoscopy  -Infectious workup: HIV neg, treponema Ab neg, f/u remaining STD panel  -Surgery recommends conservative treatment. Per IR, no indication for embolization currently as patient is HD stable  -S/p 1 unit pRBC on 3/2  -GI following, appreciate recs. NPO for possible colonoscopy today

## 2024-03-03 NOTE — DISCHARGE NOTE PROVIDER - NSFOLLOWUPCLINICS_GEN_ALL_ED_FT
[facility]  Gastroenterology  [address] [place] [address]  Phone: [phone]  Fax:
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
## 2024-03-03 NOTE — DISCHARGE NOTE PROVIDER - HOSPITAL COURSE
43M PMH hidradenitis suppurative (s/p  skin grafts in bl axilla and nape), rectal bleeds x2 ( 1 y ago and 3m ago believed to 2/2 hemorrhoids) presented for rectal bleed with 6 episodes reported over 24 hours. He was hemodynamically stable on arrival, hgb noted to be 9.7 with baseline around 13 and 1 U PRBC was given. On CT A+P showed active congestive distal bleed within the mid descending colon with surrounding fat stranding with a concentration of contrast within the medial wall. Surgery was consulted recommended no acute surgical intervention. IR was consulted and given that the patient was stable, advised GI consult. GI performed colonoscopy which showed --------. Hemoglobin was closely monitored which remained stable ----   43M PMH hidradenitis suppurative (s/p  skin grafts in bl axilla and nape), rectal bleeds x2 ( 1 y ago and 3m ago believed to 2/2 hemorrhoids) presented for rectal bleed with 6 episodes reported over 24 hours. He was hemodynamically stable on arrival, hgb noted to be 9.7 with baseline around 13 and 1 U PRBC was given. On CT A+P showed active congestive distal bleed within the mid descending colon with surrounding fat stranding with a concentration of contrast within the medial wall. Surgery was consulted recommended no acute surgical intervention. IR was consulted and given that the patient was stable, advised GI consult. Hemoglobin was closely monitored and remained stable. GI performed colonoscopy which showed --------.     CT also showed evidence of enhancing mass versus collapsed collection within the left medial gluteal fold that could represent a mass, marked hidradenitis/enhancing complex perianal/gluteal fistula. MRI was done for further evaluation which showed ------ 43M PMH hidradenitis suppurative (s/p  skin grafts in bl axilla and nape), rectal bleeds x2 ( 1 y ago and 3m ago believed to 2/2 hemorrhoids) presented for rectal bleed with 6 episodes reported over 24 hours. He was hemodynamically stable on arrival, hgb noted to be 9.7 with baseline around 13 and 1 U PRBC was given. On CT A+P showed active congestive distal bleed within the mid descending colon with surrounding fat stranding with a concentration of contrast within the medial wall. Surgery was consulted recommended no acute surgical intervention. IR was consulted and given that the patient was stable, advised GI consult. Hemoglobin was closely monitored and remained stable. GI performed colonoscopy which showed non-obstructing ulcerated mass in the descending colon and a small polyp in the sigmoid. Biopsies of the mass were taken and sent for pathology. He had no further rectal bleeding. CT Chest was also obtained to rule out possible metastasis of suspected malignancy, however this was negative for pathology. His CEA was also found to be within normal limits. CT of the pelvis also showed evidence of enhancing mass versus collapsed collection within the left medial gluteal fold that could represent a mass, marked hidradenitis/enhancing complex perianal/gluteal fistula. MRI was done for further evaluation which showed ------     Patient is now medically stable for discharge with outpatient follow up. 43M PMH hidradenitis suppurative (s/p  skin grafts in bl axilla and nape), rectal bleeds x2 ( 1 y ago and 3m ago believed to 2/2 hemorrhoids) presented for rectal bleed with 6 episodes reported over 24 hours. He was hemodynamically stable on arrival, hgb noted to be 9.7 with baseline around 13 and 1 U PRBC was given. On CT A+P showed active congestive distal bleed within the mid descending colon with surrounding fat stranding with a concentration of contrast within the medial wall. Surgery was consulted recommended no acute surgical intervention. IR was consulted and given that the patient was stable, advised GI consult. Hemoglobin was closely monitored and remained stable. GI performed colonoscopy which showed non-obstructing ulcerated mass in the descending colon and a small polyp in the sigmoid. Biopsies of the mass were taken and sent for pathology. He had no further rectal bleeding. CT Chest was also obtained to rule out possible metastasis of suspected malignancy, however this was negative for pathology. His CEA was also found to be within normal limits. CT of the pelvis also showed evidence of enhancing mass versus collapsed collection within the left medial gluteal fold that could represent a mass, marked hidradenitis/enhancing complex perianal/gluteal fistula. MRI was done for further evaluation which showed intersphincteric abscess and small subcutaneous abscesses likely 2/2 hidradenitis history. Patient without systemic infectious symptoms. Patient to follow up with GI pending biopsies, and colorectal surgery regarding colonic mass depending on biopsy results.    Patient is now medically stable for discharge with outpatient follow up.

## 2024-03-03 NOTE — H&P ADULT - NSHPPHYSICALEXAM_GEN_ALL_CORE
PHYSICAL EXAM:  GENERAL: NAD, well-developed  HEAD:  Atraumatic, Normocephalic  EYES: EOMI, PERRLA, conjunctiva and sclera clear  NECK: Supple, No JVD  CHEST/LUNG: Clear to auscultation bilaterally; No wheeze  HEART: Regular rate and rhythm; s1+ s2+  ABDOMEN: Soft, Nontender, Nondistended;  increased Bowel sounds present  EXTREMITIES:, No clubbing, cyanosis, or edema  NEUROLOGY:AAOx3 non-focal  SKIN: No rashes or lesions, dry skin w/scabs on bl LE, skin grafts on bl axilla and nape, skin graft origin on thighs

## 2024-03-03 NOTE — H&P ADULT - ASSESSMENT
43M PMH hidradenitis suppurative (s/p  skin grafts in bl axilla and nape), rectal bleeds x2 ( 1 y ago and 3m ago) w/hospital admission , presented for rectal bleed.

## 2024-03-03 NOTE — H&P ADULT - NSHPLABSRESULTS_GEN_ALL_CORE
LABS:                          9.4    7.45  )-----------( 341      ( 02 Mar 2024 20:46 )             31.5     03-02    138  |  106  |  12  ----------------------------<  84  4.4   |  24  |  0.89    Ca    8.6      02 Mar 2024 17:30    TPro  7.7  /  Alb  3.3  /  TBili  0.3  /  DBili  x   /  AST  17  /  ALT  19  /  AlkPhos  129<H>  03-02    LIVER FUNCTIONS - ( 02 Mar 2024 17:30 )  Alb: 3.3 g/dL / Pro: 7.7 g/dL / ALK PHOS: 129 U/L / ALT: 19 U/L / AST: 17 U/L / GGT: x           PT/INR - ( 02 Mar 2024 17:30 )   PT: 14.9 sec;   INR: 1.33 ratio         PTT - ( 02 Mar 2024 17:30 )  PTT:29.3 sec  Urinalysis Basic - ( 02 Mar 2024 17:30 )    Color: x / Appearance: x / SG: x / pH: x  Gluc: 84 mg/dL / Ketone: x  / Bili: x / Urobili: x   Blood: x / Protein: x / Nitrite: x   Leuk Esterase: x / RBC: x / WBC x   Sq Epi: x / Non Sq Epi: x / Bacteria: x

## 2024-03-03 NOTE — H&P ADULT - PROBLEM SELECTOR PLAN 2
-MCV 70-71  -iron deficiency anemia vs ACD vs sideroblastic anemia vs H-pylori inf vs thalassemias  -f/u iron panel, thyroid panel, coags  -f/u EKG -MCV 70-71  -iron deficiency anemia vs ACD vs sideroblastic anemia vs H-pylori inf vs thalassemias  -more likely CRYSTAL given previous bloood losses  -f/u iron panel, thyroid panel, coags  -f/u EKG

## 2024-03-03 NOTE — DISCHARGE NOTE PROVIDER - CARE PROVIDER_API CALL
Enrrique Fung  Surgery  [address] [place] [address], [address]  [place] [address]  Phone: [phone]  Fax: [phone]  Follow Up Time: 2 weeks

## 2024-03-03 NOTE — H&P ADULT - PROBLEM SELECTOR PLAN 1
-diverticulitis vs colorectal ca vs untreated STD  -hx rectal bleed 1 y ago  due to hemorrhoids which self resolved.  another episode in Nov  and was referred to GI but was unable to f/u.   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   CTAP- descending colon active bleed w/extravasation, gluteal mass vs collection and track  -HIV neg, f/u STD panel  - no external hemorrhoids on exam, no induration or mass on gluteal area, no abscess   -HS vs gluteal fistula f/u MRI  -f/u cbc, cmp, a1c, lipid panel, UA, coags  -f/u GI for colonoscopy  -surgery following- conservative treatment,  f/u Ir to embolize active bleed in mid descending colon  -serial cbc, abd exam  -prev Hgb 13.4, now 9.4, f/u cbc s/p 1 upRBC  - -diverticulitis vs colorectal ca vs untreated STD  -hx rectal bleed 1 y ago  due to hemorrhoids which self resolved.  another episode in Nov  and was referred to GI but was unable to f/u.   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   CTAP- descending colon active bleed w/extravasation, gluteal mass vs collection and track  -HIV neg, f/u STD panel  - no external hemorrhoids on exam, no induration or mass on gluteal area, no abscess   -HS vs gluteal fistula f/u MRI  -f/u cbc, cmp, a1c, lipid panel, UA, coags  -f/u GI for colonoscopy  -surgery following- conservative treatment,  f/u Ir to embolize active bleed in mid descending colon  -serial cbc q8hr, abd exam q12  -prev Hgb 13.4, now 9.4, f/u cbc s/p 1 upRBC  -

## 2024-03-03 NOTE — H&P ADULT - PROBLEM SELECTOR PLAN 3
DVT- not indicated  GI -not indicate  Diet- CLD, advance as per GI orders  Dispo_ pending clinical course DVT- not indicated  GI -not indicate  Diet- NPO, advance as per GI orders  Dispo_ pending clinical course

## 2024-03-03 NOTE — DISCHARGE NOTE PROVIDER - NSDCCPTREATMENT_GEN_ALL_CORE_FT
PRINCIPAL PROCEDURE  Procedure: CT angio abdomen  Findings and Treatment: IMPRESSION:  Active congestive distal bleed within the mid descending colon with   surrounding fat stranding and there appears to be concentration of   contrast within the medial wall. Further evaluation with colonoscopy is   recommended exclude underlying lesion.  There appears to be a enhancing mass versus collapsed collection within   the left medial gluteal fold that appears to extend to the posterior   aspect of the internal sphincter as well as an apparent tract within the   right medial gluteal fold. This could represent a mass, marked   hidradenitis/enhancing complex perianal/gluteal fistula. Further   evaluation with perianal fistula protocol MRI is recommended.       PRINCIPAL PROCEDURE  Procedure: CT angio abdomen  Findings and Treatment: IMPRESSION:  Active congestive distal bleed within the mid descending colon with   surrounding fat stranding and there appears to be concentration of   contrast within the medial wall. Further evaluation with colonoscopy is   recommended exclude underlying lesion.  There appears to be a enhancing mass versus collapsed collection within   the left medial gluteal fold that appears to extend to the posterior   aspect of the internal sphincter as well as an apparent tract within the   right medial gluteal fold. This could represent a mass, marked   hidradenitis/enhancing complex perianal/gluteal fistula. Further   evaluation with perianal fistula protocol MRI is recommended.        SECONDARY PROCEDURE  Procedure: Colonoscopy  Findings and Treatment: Findings:       40cm from the anal verge, noted an area of congestion and erythema that        did not insufflate well with CO2 and air. The endoscope was then        withdrawn and an endoscopic cap was placed. The cap allowed for limited        but improved visualization of the area. An ulcerated non-obstructing,        circumferential lesion was seen. Oozing was present. Biopsies were taken        with a cold forceps for histology under limited visualization. 3cm        distal to the lesion, an area was tattooed with an injection of Ying        ink.       A 7 mm polyp was found in the sigmoid colon. The polyp was pedunculated.        Polypectomy was not attempted due to the identification of possible        cancer/colitis. The top of the polyp was removed due to the plastic        endoscopic cap, and was not retrieved.       The exam was otherwise normal throughout the examined colon.                                                                                   Impression:          - Rule out malignancy, tumor in the descending colon.                        Biopsied. Tattooed. DDx includes segmental colitis vs.                        malignancy.                       - One 7 mm polyp in the sigmoid colon. Resection not                        attempted.       PRINCIPAL PROCEDURE  Procedure: CT angio abdomen  Findings and Treatment: IMPRESSION:  Active congestive distal bleed within the mid descending colon with   surrounding fat stranding and there appears to be concentration of   contrast within the medial wall. Further evaluation with colonoscopy is   recommended exclude underlying lesion.  There appears to be a enhancing mass versus collapsed collection within   the left medial gluteal fold that appears to extend to the posterior   aspect of the internal sphincter as well as an apparent tract within the   right medial gluteal fold. This could represent a mass, marked   hidradenitis/enhancing complex perianal/gluteal fistula. Further   evaluation with perianal fistula protocol MRI is recommended.        SECONDARY PROCEDURE  Procedure: Colonoscopy  Findings and Treatment: Findings:       40cm from the anal verge, noted an area of congestion and erythema that        did not insufflate well with CO2 and air. The endoscope was then        withdrawn and an endoscopic cap was placed. The cap allowed for limited        but improved visualization of the area. An ulcerated non-obstructing,        circumferential lesion was seen. Oozing was present. Biopsies were taken        with a cold forceps for histology under limited visualization. 3cm        distal to the lesion, an area was tattooed with an injection of Ying        ink.       A 7 mm polyp was found in the sigmoid colon. The polyp was pedunculated.        Polypectomy was not attempted due to the identification of possible        cancer/colitis. The top of the polyp was removed due to the plastic        endoscopic cap, and was not retrieved.       The exam was otherwise normal throughout the examined colon.                                                                                   Impression:          - Rule out malignancy, tumor in the descending colon.                        Biopsied. Tattooed. DDx includes segmental colitis vs.                        malignancy.                       - One 7 mm polyp in the sigmoid colon. Resection not                        attempted.      Procedure: MR pelvis  Findings and Treatment: INDINGS:  Intersphincteric abscess at the left posterior quadrant approximately (4   to 7:00 position) with extending towards the anal verge, where there are   multiple enhancing tracts along both sides of the gluteal cleft and   extending along the left anterior perineum. Inflammatory changes are more   prominent on the left. Portions of the bilateral tracts are scarred.   Small subcutaneous abscesses are present along the left gluteal cleft   (series 9, images 24 and 27).  IMPRESSION:  Left posterior intersphincteric abscess occurring on a background of   active and chronic bilateral gluteal tracts, probably related to provided   history of hidradenitis suppurativa.  Small subcutaneous abscesses in left gluteal region.

## 2024-03-03 NOTE — H&P ADULT - HISTORY OF PRESENT ILLNESS
43M PMH hidradenitis suppurative (s/p  skin grafts in bl axilla and nape), rectal bleeds x2 ( 1 y ago and 3m ago) w/hospital admission , presented for rectal bleed. Pt reports he had a rectal bleed 1 y ago  due to hemorrhoids which self resolved. He had another episode in Nov  and was referred to GI but was unable to f/u. Pt reports he has active bleeding from rectum for the last 24 hrs w/clots , about 5-6 large episodes. Pt renies f/c, HA, SOB, CP, palpitations, dizziness, n/v/d/c, changes in urinary habits, abd pain, other bleeding sites. Pt is not on AP or AC. Pt did not have any bleeding episodes since Nov 2023. Pt did not have any surgeries besides HS. Pt denies fam hx of similar presentation, cancer, other medical problems. Pt doesn't have PMD. NKDA. 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 Pt denies alcohol, tobacco illicit or recreational drug use.  43M PMH hidradenitis suppurative (s/p  skin grafts in bl axilla and nape), rectal bleeds x2 ( 1 y ago and 3m ago) w/hospital admission , presented for rectal bleed. Pt reports he had a rectal bleed 1 y ago  due to hemorrhoids which self resolved. He had another episode in Nov  and was referred to GI but was unable to f/u. Pt reports he has active bleeding from rectum for the last 24 hrs w/clots , about 5-6 large episodes. Pt renies f/c, HA, SOB, CP, palpitations, dizziness, n/v/d/c, changes in urinary habits, abd pain, other bleeding sites. Pt is not on AP or AC. Pt denies hx of NSAID use. Pt did not have any bleeding episodes since Nov 2023. Pt did not have any surgeries besides HS. Pt denies fam hx of similar presentation, cancer, other medical problems. Pt doesn't have PMD. NKDA. 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 Pt denies alcohol, tobacco illicit or recreational drug use.

## 2024-03-03 NOTE — PROGRESS NOTE ADULT - PROBLEM SELECTOR PLAN 2
-C/f perianal fistula on CT: enhancing mass versus collapsed collection within the left medial gluteal fold that appears to extend to the posterior aspect of the internal sphincter;  tract within the   right medial gluteal fold.   -Recommend further evaluation with MRI   [] f/u MRI  [] consider colorectal surgery consult if definitive

## 2024-03-03 NOTE — CONSULT NOTE ADULT - SUBJECTIVE AND OBJECTIVE BOX
Chief Complaint:  rectal bleed    HPI:    43M PMH hidradenitis suppurative (s/p  skin grafts in bl axilla and nape), rectal bleeds x2 ( 1 y ago and 3m ago) w/hospital admission , presented for rectal bleed. Pt reports he had a rectal bleed 1 y ago  due to hemorrhoids which self resolved. He had another episode in Nov  and was referred to GI but was unable to f/u. Pt reports he has active bleeding from rectum for the last 24 hrs w/clots , about 5-6 large episodes. Pt renies f/c, HA, SOB, CP, palpitations, dizziness, n/v/d/c, changes in urinary habits, abd pain, other bleeding sites. Pt is not on AP or AC. Pt denies hx of NSAID use. Pt did not have any bleeding episodes since Nov 2023. Pt did not have any surgeries besides HS. Pt denies fam hx of similar presentation, cancer, other medical problems. Pt doesn't have PMD. NKDA. Pt lives alone and is a [facility]  lifting heavy boxes around 40 lbs all day. Pt denies alcohol, tobacco illicit or recreational drug use.       Allergies:  No Known Allergies      Hospital Medications:  acetaminophen     Tablet .. 650 milliGRAM(s) Oral every 6 hours PRN  aluminum hydroxide/magnesium hydroxide/simethicone Suspension 30 milliLiter(s) Oral every 4 hours PRN  iron sucrose IVPB 300 milliGRAM(s) IV Intermittent every 24 hours  melatonin 3 milliGRAM(s) Oral at bedtime PRN  ondansetron Injectable 4 milliGRAM(s) IV Push every 8 hours PRN  pantoprazole  Injectable 40 milliGRAM(s) IV Push every 12 hours  sodium chloride 0.9%. 1000 milliLiter(s) IV Continuous <Continuous>      PMHX/PSHX:  Hydradenitis        Family history:      Social History: no smoking    ROS:   See HPI      PHYSICAL EXAM:   GENERAL:  NAD, resting comfortably in bed  HEENT:  Sclera anicteric  CHEST:  Normal effort  HEART:  HDS  ABDOMEN:  Soft, non-tender, non-distended  EXTREMITIES:  No edema  SKIN:  Warm & Dry.   NEURO:  Alert, conversant, no focal deficit    Vital Signs:  Vital Signs Last 24 Hrs  T(C): 36.3 (03 Mar 2024 07:08), Max: 37.1 (02 Mar 2024 21:40)  T(F): 97.4 (03 Mar 2024 07:08), Max: 98.8 (02 Mar 2024 21:40)  HR: 87 (03 Mar 2024 07:08) (81 - 108)  BP: 124/77 (03 Mar 2024 07:08) (103/60 - 124/77)  BP(mean): --  RR: 17 (03 Mar 2024 07:08) (17 - 18)  SpO2: 100% (03 Mar 2024 07:08) (100% - 100%)    Parameters below as of 03 Mar 2024 07:08  Patient On (Oxygen Delivery Method): room air      Daily Height in cm: 154.68 (02 Mar 2024 23:54)    Daily     LABS:                        9.4    5.97  )-----------( 284      ( 03 Mar 2024 07:46 )             29.5     Mean Cell Volume: 70.6 fL (03-03-24 @ 07:46)    03-03    140  |  107  |  9   ----------------------------<  93  3.9   |  24  |  0.81    Ca    8.2<L>      03 Mar 2024 07:46  Phos  3.0     03-03  Mg     2.20     03-03    TPro  7.7  /  Alb  3.3  /  TBili  0.3  /  DBili  x   /  AST  17  /  ALT  19  /  AlkPhos  129<H>  03-02    LIVER FUNCTIONS - ( 02 Mar 2024 17:30 )  Alb: 3.3 g/dL / Pro: 7.7 g/dL / ALK PHOS: 129 U/L / ALT: 19 U/L / AST: 17 U/L / GGT: x           PT/INR - ( 03 Mar 2024 07:46 )   PT: 14.1 sec;   INR: 1.27 ratio         PTT - ( 03 Mar 2024 07:46 )  PTT:30.7 sec  Urinalysis Basic - ( 03 Mar 2024 07:46 )    Color: x / Appearance: x / SG: x / pH: x  Gluc: 93 mg/dL / Ketone: x  / Bili: x / Urobili: x   Blood: x / Protein: x / Nitrite: x   Leuk Esterase: x / RBC: x / WBC x   Sq Epi: x / Non Sq Epi: x / Bacteria: x                              9.4    5.97  )-----------( 284      ( 03 Mar 2024 07:46 )             29.5                         9.4    7.45  )-----------( 341      ( 02 Mar 2024 20:46 )             31.5                         9.6    7.58  )-----------( 345      ( 02 Mar 2024 17:30 )             31.6     Imaging:    < from: CT Abdomen and Pelvis w/ IV Cont (03.02.24 @ 18:50) >  FINDINGS:  LOWER CHEST: Within normal limits.    LIVER: There are a few small benign hepatic cysts measuring up to 1.9 cm   within segment 2 of the liver (image #13, series 305).  BILE DUCTS: Normal caliber.  GALLBLADDER: Within normal limits.  SPLEEN: Within normal limits.  PANCREAS: Within normal limits.  ADRENALS: Within normal limits.  KIDNEYS/URETERS: Within normal limits.    BLADDER: Within normal limits.  REPRODUCTIVE ORGANS: Prostate within normal limits.    BOWEL: Evaluation for active bleed is somewhat limited due to technical   error resulting in no precontrast imaging. There is an active bleed   within the mid descending colon and there is surrounding fat stranding as   well as apparent concentration within the medial wall. There is is an   irregular area of masslike enhancement measuring approximately5.2 x 5.4   x 8.7 cm within the left medial gluteal fold (image #117, series   303/axial images and image #80, series 608/sagittal images) extending to   the posterior aspect of the internal sphincter. No internal fluid is   appreciated. There also appears to be a tract within the right medial   gluteal fat that also extends anteriorly toward the sphincter complex   with an apparent drainage track within the superficial medial gluteal   fold (image #109, series 303). Appendix is normal.  PERITONEUM: No ascites.  VESSELS: Within normal limits.  RETROPERITONEUM/LYMPH NODES: No lymphadenopathy.  ABDOMINAL WALL: See above for valvular abnormalities. Otherwise   unremarkable.  BONES: Within normal limits.    IMPRESSION:  Active congestive distal bleed within the mid descending colon with   surrounding fat stranding and there appears to be concentration of   contrast within the medial wall. Further evaluation with colonoscopy is   recommended exclude underlying lesion.    There appears to be a enhancing mass versus collapsed collection within   the left medial gluteal fold that appears to extend to the posterior   aspect of the internal sphincter as well as an apparent tract within the   right medial gluteal fold. This could represent a mass, marked   hidradenitis/enhancing complex perianal/gluteal fistula. Further   evaluation with perianal fistula protocol MRI is recommended.    < end of copied text >     Chief Complaint:  rectal bleed    HPI:    This is a 43 year old male with hidradenitis suppurative (s/p skin grafts in bl axilla and nape) and prior rectal bleeds who presented for bright red blood per rectum. Pt reports he had a rectal bleed 1 y ago due to hemorrhoids which self resolved. He had another episode in Nov and was referred to GI but was unable to f/u. Pt reports he has active bleeding from rectum for the last 24 hrs w/clots associated with left-sided abdominal pain, about 5-6 large episodes, associated with recent constipation and straining almost daily with hard stools. Pt denies f/c, HA, SOB, CP, palpitations, dizziness, n/v/d, changes in urinary habits, or other bleeding sites. Pt is not on AP or AC. Pt denies hx of NSAID use. Pt did not have any bleeding episodes since Nov 2023. Pt did not have any surgeries besides HS. No family history of colon cancer. He had a colonoscopy approximately 15 years ago however cannot recall the indication or results.     Upon arrival, hgb 9.6 from baseline 13 in Nov 2023. He received 1 unit pRBC with subsequent hgb stable in the 9s. CT A/P positive for active bleed from the descending colon as well as gluteal collection. Pt reports known gluteal collection related to HS.     Allergies:  No Known Allergies    Hospital Medications:  acetaminophen     Tablet .. 650 milliGRAM(s) Oral every 6 hours PRN  aluminum hydroxide/magnesium hydroxide/simethicone Suspension 30 milliLiter(s) Oral every 4 hours PRN  iron sucrose IVPB 300 milliGRAM(s) IV Intermittent every 24 hours  melatonin 3 milliGRAM(s) Oral at bedtime PRN  ondansetron Injectable 4 milliGRAM(s) IV Push every 8 hours PRN  pantoprazole  Injectable 40 milliGRAM(s) IV Push every 12 hours  sodium chloride 0.9%. 1000 milliLiter(s) IV Continuous <Continuous>      PMHX/PSHX:  Hydradenitis      Family history:  no colon cancer    Social History: no smoking    ROS:   See HPI    PHYSICAL EXAM:   GENERAL:  NAD, resting comfortably in bed  HEENT:  Sclera anicteric  CHEST:  Normal effort  HEART:  HDS  ABDOMEN:  Soft, non-tender, non-distended  RECTAL: External examination with firm collection at the left gluteal region and skin changes reflecting HS. Chaperoned by Jason Diggs MD.   EXTREMITIES:  No edema  SKIN:  Warm & Dry.   NEURO:  Alert, conversant, no focal deficit    Vital Signs:  Vital Signs Last 24 Hrs  T(C): 36.3 (03 Mar 2024 07:08), Max: 37.1 (02 Mar 2024 21:40)  T(F): 97.4 (03 Mar 2024 07:08), Max: 98.8 (02 Mar 2024 21:40)  HR: 87 (03 Mar 2024 07:08) (81 - 108)  BP: 124/77 (03 Mar 2024 07:08) (103/60 - 124/77)  BP(mean): --  RR: 17 (03 Mar 2024 07:08) (17 - 18)  SpO2: 100% (03 Mar 2024 07:08) (100% - 100%)    Parameters below as of 03 Mar 2024 07:08  Patient On (Oxygen Delivery Method): room air      Daily Height in cm: 154.68 (02 Mar 2024 23:54)    Daily     LABS:                        9.4    5.97  )-----------( 284      ( 03 Mar 2024 07:46 )             29.5     Mean Cell Volume: 70.6 fL (03-03-24 @ 07:46)    03-03    140  |  107  |  9   ----------------------------<  93  3.9   |  24  |  0.81    Ca    8.2<L>      03 Mar 2024 07:46  Phos  3.0     03-03  Mg     2.20     03-03    TPro  7.7  /  Alb  3.3  /  TBili  0.3  /  DBili  x   /  AST  17  /  ALT  19  /  AlkPhos  129<H>  03-02    LIVER FUNCTIONS - ( 02 Mar 2024 17:30 )  Alb: 3.3 g/dL / Pro: 7.7 g/dL / ALK PHOS: 129 U/L / ALT: 19 U/L / AST: 17 U/L / GGT: x           PT/INR - ( 03 Mar 2024 07:46 )   PT: 14.1 sec;   INR: 1.27 ratio         PTT - ( 03 Mar 2024 07:46 )  PTT:30.7 sec  Urinalysis Basic - ( 03 Mar 2024 07:46 )    Color: x / Appearance: x / SG: x / pH: x  Gluc: 93 mg/dL / Ketone: x  / Bili: x / Urobili: x   Blood: x / Protein: x / Nitrite: x   Leuk Esterase: x / RBC: x / WBC x   Sq Epi: x / Non Sq Epi: x / Bacteria: x                              9.4    5.97  )-----------( 284      ( 03 Mar 2024 07:46 )             29.5                         9.4    7.45  )-----------( 341      ( 02 Mar 2024 20:46 )             31.5                         9.6    7.58  )-----------( 345      ( 02 Mar 2024 17:30 )             31.6     Imaging:    < from: CT Abdomen and Pelvis w/ IV Cont (03.02.24 @ 18:50) >  FINDINGS:  LOWER CHEST: Within normal limits.    LIVER: There are a few small benign hepatic cysts measuring up to 1.9 cm   within segment 2 of the liver (image #13, series 305).  BILE DUCTS: Normal caliber.  GALLBLADDER: Within normal limits.  SPLEEN: Within normal limits.  PANCREAS: Within normal limits.  ADRENALS: Within normal limits.  KIDNEYS/URETERS: Within normal limits.    BLADDER: Within normal limits.  REPRODUCTIVE ORGANS: Prostate within normal limits.    BOWEL: Evaluation for active bleed is somewhat limited due to technical   error resulting in no precontrast imaging. There is an active bleed   within the mid descending colon and there is surrounding fat stranding as   well as apparent concentration within the medial wall. There is is an   irregular area of masslike enhancement measuring approximately5.2 x 5.4   x 8.7 cm within the left medial gluteal fold (image #117, series   303/axial images and image #80, series 608/sagittal images) extending to   the posterior aspect of the internal sphincter. No internal fluid is   appreciated. There also appears to be a tract within the right medial   gluteal fat that also extends anteriorly toward the sphincter complex   with an apparent drainage track within the superficial medial gluteal   fold (image #109, series 303). Appendix is normal.  PERITONEUM: No ascites.  VESSELS: Within normal limits.  RETROPERITONEUM/LYMPH NODES: No lymphadenopathy.  ABDOMINAL WALL: See above for valvular abnormalities. Otherwise   unremarkable.  BONES: Within normal limits.    IMPRESSION:  Active congestive distal bleed within the mid descending colon with   surrounding fat stranding and there appears to be concentration of   contrast within the medial wall. Further evaluation with colonoscopy is   recommended exclude underlying lesion.    There appears to be a enhancing mass versus collapsed collection within   the left medial gluteal fold that appears to extend to the posterior   aspect of the internal sphincter as well as an apparent tract within the   right medial gluteal fold. This could represent a mass, marked   hidradenitis/enhancing complex perianal/gluteal fistula. Further   evaluation with perianal fistula protocol MRI is recommended.    < end of copied text >

## 2024-03-03 NOTE — CONSULT NOTE ADULT - SUBJECTIVE AND OBJECTIVE BOX
Interventional Radiology    HPI: 43M PMH hidradenitis suppurative (s/p  skin grafts in bl axilla and nape), rectal bleeds x2 ( 1 y ago and 3m ago) w/hospital admission , presented for rectal bleed. Pt reports he had a rectal bleed 1 y ago  due to hemorrhoids which self resolved. He had another episode in Nov  and was referred to GI but was unable to f/u. Pt reports he has active bleeding from rectum for the last 24 hrs w/clots , about 5-6 large episodes. Pt renies f/c, HA, SOB, CP, palpitations, dizziness, n/v/d/c, changes in urinary habits, abd pain, other bleeding sites. Pt is not on AP or AC. Pt denies hx of NSAID use. Pt did not have any bleeding episodes since Nov 2023. Pt did not have any surgeries besides HS. Pt denies fam hx of similar presentation, cancer, other medical problems. Pt doesn't have PMD. NKDA. Pt lives alone and is a [facility]  lifting heavy boxes around 40 lbs all day. Pt denies alcohol, tobacco illicit or recreational drug use.     Allergies: No Known Allergies    Medications (Abx/Cardiac/Anticoagulation/Blood Products)      Data:  154.7  82.3  T(C): 36.3  HR: 87  BP: 124/77  RR: 17  SpO2: 100%    LABS:                          9.4    5.97  )-----------( 284      ( 03 Mar 2024 07:46 )             29.5     03-03    140  |  107  |  9   ----------------------------<  93  3.9   |  24  |  0.81    Ca    8.2<L>      03 Mar 2024 07:46  Phos  3.0     03-03  Mg     2.20     03-03    TPro  7.7  /  Alb  3.3  /  TBili  0.3  /  DBili  x   /  AST  17  /  ALT  19  /  AlkPhos  129<H>  03-02    PT/INR - ( 03 Mar 2024 07:46 )   PT: 14.1 sec;   INR: 1.27 ratio         PTT - ( 03 Mar 2024 07:46 )  PTT:30.7 sec    Radiology: CTA reviewed    Assessment/Plan: 42 yo M p/w rectal bleed     -- CTA reviewed, region of descending colon appears to be more of an enhancing mass rather than acute bleed  --Patient has remained hemodynamically stable, transfused 1u PRBC thus far  --Recommend continued conservative management and GI follow up colonoscopy  --IR remains available should patient's clinical status change    Vik Martin MD   Interventional Radiology PGY 5  Available on [facility] TEAMS    For EMERGENT inquiries/questions:  IR Pager ([facility]): [phone]  IR Pager ([facility]): [phone] ; [phone]    For non-emergent consults/questions:   Please place a sunrise order "Consult- Interventional Radiology" with an appropriate callback number    For questions about scheduling during appropriate work hours, call IR :  [facility]: [phone]  [facility]: [phone]    For outpatient IR booking:  [facility]: [phone]  [facility]: [phone]

## 2024-03-03 NOTE — PROGRESS NOTE ADULT - ASSESSMENT
44 YO M hx of HS and hemorrhoids p/w 1 day of bloody BM. Active extravasation on CT scan at mid descending colon.  Surgery consulted for eval.    Plan/recs  - recommend IR consult for possible embolization in setting of active bleed in mid-descending colon  - recommend GI consult for colonoscopy  - no acute surgical intervention at this time, likely more beneficial to attempt more minimally invasive approaches    Surgery Team B  [id number]

## 2024-03-03 NOTE — H&P ADULT - ATTENDING COMMENTS
43M with PMH of hidradenitis suppurative (s/p skin grafts) and rectal bleeds (attributed to hemorrhoids in the past) presenting for BRBPR, w/ active bleed seen on CT imaging admitted for further management    #BRBPR  Presents w/ a day of bloody stools, described as bright red with clots noticeable. CTAP noted w/ descending colon active bleed w/ extravasation, unclear if underlying lesion is present. BUN/Cr ratio<20, clots seen in blood, less suspicion for UGI source. Denies NSAID use nor FH of colon cancers. GI c/s by ED for colonoscopy, Surgical recs appreciated, no acute surgical interventions. IR c/s for possible embolization. Maintain 2 large bore IVs, Monitor CBC Q8H, pRBC transfusions Hgb<7, f/u GI and IR.    #Hidradenitis suppurative  History of hidradenitis suppurative s/p skin grafts. CT noted w/ gluteal mass vs. collection or fistula tract. Has skin lesions noticeable on gluteal region but doesn't appear purulent nor significant tenderness/warmth/bleed on exam. F/u perianal fistula MRI for further eval 43M with PMH of hidradenitis suppurative (s/p skin grafts) and rectal bleeds (attributed to hemorrhoids in the past) presenting for BRBPR, w/ active bleed seen on CT imaging admitted for further management    #BRBPR  Presents w/ a day of bloody stools, described as bright red with clots noticeable. CTAP noted w/ descending colon active bleed w/ extravasation, unclear if underlying lesion is present. BUN/Cr ratio<20, clots seen in blood, less suspicion for UGI source but will start on PPI pending scope in case of brisk upper. Denies NSAID use nor FH of colon cancers. GI c/s by ED for colonoscopy, Surgical recs appreciated, no acute surgical interventions. IR c/s for possible embolization. Maintain 2 large bore IVs, Monitor CBC Q8H, pRBC transfusions Hgb<7, f/u GI and IR.    #Hidradenitis suppurative  History of hidradenitis suppurative s/p skin grafts. CT noted w/ gluteal mass vs. collection or fistula tract. Has skin lesions noticeable on gluteal region but doesn't appear purulent nor significant tenderness/warmth/bleed on exam. F/u perianal fistula MRI for further eval    #Anemia  Likely CRYSTAL given microcytic anemia iso prior bleeds and now active bleed. Will attempt to add-on iron studies to initial labs prior to transfusion, f/u if able to be added on 43M with PMH of hidradenitis suppurative (s/p skin grafts) and rectal bleeds (attributed to hemorrhoids in the past) presenting for BRBPR, w/ active bleed seen on CT imaging admitted for further management    #BRBPR  Presents w/ a day of bloody stools, described as bright red with clots noticeable. CTAP noted w/ descending colon active bleed w/ extravasation, unclear if underlying lesion is present. BUN/Cr ratio<20, clots seen in blood, less suspicion for UGI source but will start on PPI pending scope in case of brisk upper. Denies NSAID use nor FH of colon cancers. GI c/s by ED for colonoscopy, Surgical recs appreciated, no acute surgical interventions. IR c/s for possible embolization. Maintain 2 large bore IVs, Monitor CBC Q8H, pRBC transfusions Hgb<7, f/u GI and IR.    #Hidradenitis suppurative  History of hidradenitis suppurative s/p skin grafts. CT noted w/ gluteal mass vs. collection or fistula tract. Has skin lesions noticeable on gluteal region but doesn't appear purulent nor significant tenderness/warmth/bleed on exam. F/u perianal fistula MRI for further eval    #Anemia  Likely CRYSTAL given microcytic anemia iso prior history of bleeds and now active bleed. Will attempt to add-on iron studies to initial labs prior to transfusion otherwise likely to be altered after transfusion 43M with PMH of hidradenitis suppurative (s/p skin grafts) and rectal bleeds (attributed to hemorrhoids in the past) presenting for BRBPR, w/ active bleed seen on CT imaging admitted for further management    #BRBPR  Presents w/ a day of bloody stools, described as bright red with clots noticeable. CTAP noted w/ descending colon active bleed w/ extravasation, unclear if underlying lesion is present. BUN/Cr ratio<20, clots seen in blood, less suspicion for UGI source but will start on PPI pending scope in case of brisk upper. Denies NSAID use nor FH of colon cancers. GI c/s by ED for colonoscopy, Surgical recs appreciated, no acute surgical interventions. IR c/s for possible embolization. Maintain 2 large bore IVs, Monitor CBC Q8H, pRBC transfusions Hgb<7, f/u GI and IR.    #Hidradenitis suppurative  History of hidradenitis suppurative s/p skin grafts. CT noted w/ gluteal mass vs. collection or fistula tract. Has skin lesions noticeable on gluteal region but doesn't appear purulent nor significant tenderness/warmth/bleed on exam. F/u perianal fistula MRI for further eval    #Anemia  Likely CRYSTAL given microcytic anemia iso prior history of bleeds prior normal CBC and now active bleed. Will attempt to add-on iron studies to initial labs prior to transfusion otherwise likely to be altered after transfusion

## 2024-03-03 NOTE — DISCHARGE NOTE PROVIDER - NSDCFUSCHEDAPPT_GEN_ALL_CORE_FT
Edwin Gracia  [facility]  [facility]STR[facility] [address] [facility]  Scheduled Appointment: 03/25/2024

## 2024-03-03 NOTE — DISCHARGE NOTE PROVIDER - NSDCMRMEDTOKEN_GEN_ALL_CORE_FT
ferrous sulfate 324 mg (65 mg elemental iron) oral delayed release tablet: 1 tab(s) orally once a day  polyethylene glycol 3350 oral powder for reconstitution: 17 gram(s) orally once a day

## 2024-03-03 NOTE — DISCHARGE NOTE PROVIDER - NSDCFUADDAPPT_GEN_ALL_CORE_FT
APPTS ARE READY TO BE MADE: [ ] YES    Best Family or Patient Contact (if needed):    Additional Information about above appointments (if needed):    1:   2:   3:     Other comments or requests:    APPTS ARE READY TO BE MADE: [X] YES    Best Family or Patient Contact (if needed):    Additional Information about above appointments (if needed):    1: Colorectal surgery - Dr Fung  2: Gastroenterology clinic  3:     Other comments or requests:    APPTS ARE READY TO BE MADE: [X] YES    Best Family or Patient Contact (if needed):    Additional Information about above appointments (if needed):    1: Colorectal surgery - Dr Fung  2: Gastroenterology clinic  3:   Prior to outreaching the patient, it was visible that the patient has secured a follow up appointment which was not scheduled by our team. on 03/25 at 3:15pm with   Other comments or requests:

## 2024-03-04 ENCOUNTER — RESULT REVIEW (OUTPATIENT)
Age: 44
End: 2024-03-04

## 2024-03-04 LAB
ANION GAP SERPL CALC-SCNC: 7 MMOL/L — SIGNIFICANT CHANGE UP (ref 7–14)
APTT BLD: 34.6 SEC — SIGNIFICANT CHANGE UP (ref 24.5–35.6)
BUN SERPL-MCNC: 6 MG/DL — LOW (ref 7–23)
CALCIUM SERPL-MCNC: 8 MG/DL — LOW (ref 8.4–10.5)
CHLORIDE SERPL-SCNC: 107 MMOL/L — SIGNIFICANT CHANGE UP (ref 98–107)
CO2 SERPL-SCNC: 23 MMOL/L — SIGNIFICANT CHANGE UP (ref 22–31)
CREAT SERPL-MCNC: 0.86 MG/DL — SIGNIFICANT CHANGE UP (ref 0.5–1.3)
EGFR: 109 ML/MIN/1.73M2 — SIGNIFICANT CHANGE UP
GLUCOSE SERPL-MCNC: 83 MG/DL — SIGNIFICANT CHANGE UP (ref 70–99)
HCT VFR BLD CALC: 27.5 % — LOW (ref 39–50)
HGB BLD-MCNC: 8.7 G/DL — LOW (ref 13–17)
INR BLD: 1.27 RATIO — HIGH (ref 0.85–1.18)
MAGNESIUM SERPL-MCNC: 2.1 MG/DL — SIGNIFICANT CHANGE UP (ref 1.6–2.6)
MCHC RBC-ENTMCNC: 22.4 PG — LOW (ref 27–34)
MCHC RBC-ENTMCNC: 31.6 GM/DL — LOW (ref 32–36)
MCV RBC AUTO: 70.9 FL — LOW (ref 80–100)
NRBC # BLD: 0 /100 WBCS — SIGNIFICANT CHANGE UP (ref 0–0)
NRBC # FLD: 0 K/UL — SIGNIFICANT CHANGE UP (ref 0–0)
PHOSPHATE SERPL-MCNC: 2.5 MG/DL — SIGNIFICANT CHANGE UP (ref 2.5–4.5)
PLATELET # BLD AUTO: 272 K/UL — SIGNIFICANT CHANGE UP (ref 150–400)
POTASSIUM SERPL-MCNC: 3.8 MMOL/L — SIGNIFICANT CHANGE UP (ref 3.5–5.3)
POTASSIUM SERPL-SCNC: 3.8 MMOL/L — SIGNIFICANT CHANGE UP (ref 3.5–5.3)
PROTHROM AB SERPL-ACNC: 14.2 SEC — HIGH (ref 9.5–13)
RBC # BLD: 3.88 M/UL — LOW (ref 4.2–5.8)
RBC # FLD: 16.9 % — HIGH (ref 10.3–14.5)
SODIUM SERPL-SCNC: 137 MMOL/L — SIGNIFICANT CHANGE UP (ref 135–145)
WBC # BLD: 5.31 K/UL — SIGNIFICANT CHANGE UP (ref 3.8–10.5)
WBC # FLD AUTO: 5.31 K/UL — SIGNIFICANT CHANGE UP (ref 3.8–10.5)

## 2024-03-04 PROCEDURE — 88341 IMHCHEM/IMCYTCHM EA ADD ANTB: CPT | Mod: 26

## 2024-03-04 PROCEDURE — 71260 CT THORAX DX C+: CPT | Mod: 26

## 2024-03-04 PROCEDURE — 88342 IMHCHEM/IMCYTCHM 1ST ANTB: CPT | Mod: 26

## 2024-03-04 PROCEDURE — 72197 MRI PELVIS W/O & W/DYE: CPT | Mod: 26

## 2024-03-04 PROCEDURE — 99222 1ST HOSP IP/OBS MODERATE 55: CPT

## 2024-03-04 PROCEDURE — 88305 TISSUE EXAM BY PATHOLOGIST: CPT | Mod: 26

## 2024-03-04 PROCEDURE — 99232 SBSQ HOSP IP/OBS MODERATE 35: CPT | Mod: GC

## 2024-03-04 PROCEDURE — 45380 COLONOSCOPY AND BIOPSY: CPT

## 2024-03-04 RX ADMIN — IRON SUCROSE 176.67 MILLIGRAM(S): 20 INJECTION, SOLUTION INTRAVENOUS at 18:56

## 2024-03-04 RX ADMIN — PANTOPRAZOLE SODIUM 40 MILLIGRAM(S): 20 TABLET, DELAYED RELEASE ORAL at 05:50

## 2024-03-04 NOTE — CONSULT NOTE ADULT - SUBJECTIVE AND OBJECTIVE BOX
SURGERY CONSULT NOTE    Patient is a 43y old  Male who presents with a chief complaint of rectal bleed (04 Mar 2024 07:25)      HPI:  43M PMH hidradenitis suppurative (s/p  skin grafts in bl axilla and nape), rectal bleeds x2 ( 1 y ago and 3m ago) w/hospital admission , presented for rectal bleed. Pt reports he had a rectal bleed 1 y ago  due to hemorrhoids which self resolved. He had another episode in Nov  and was referred to GI but was unable to f/u. Pt reports he has active bleeding from rectum for the last 24 hrs w/clots , about 5-6 large episodes. Pt renies f/c, HA, SOB, CP, palpitations, dizziness, n/v/d/c, changes in urinary habits, abd pain, other bleeding sites. Pt is not on AP or AC. Pt denies hx of NSAID use. Pt did not have any bleeding episodes since Nov 2023. Pt did not have any surgeries besides HS. Pt denies fam hx of similar presentation, cancer, other medical problems. Pt doesn't have PMD. NKDA. Pt lives alone and is a [employer]  lifting heavy boxes around 40 lbs all day. Pt denies alcohol, tobacco illicit or recreational drug use.  (03 Mar 2024 00:02)    CTA reviewed by IR team and region of descending colon appears to be more of an enhancing mass rather than acute bleed. Patient planned for colonoscopy today with GI. Colorectal surgery consulted for evaluation.          PAST MEDICAL & SURGICAL HISTORY:  Hydradenitis        [  ] No significant past history as reviewed with the patient and family    FAMILY HISTORY:    [  ] Family history not pertinent as reviewed with the patient and family    SOCIAL HISTORY:    MEDICATIONS  (STANDING):  iron sucrose IVPB 300 milliGRAM(s) IV Intermittent every 24 hours  pantoprazole  Injectable 40 milliGRAM(s) IV Push every 12 hours  sodium chloride 0.9%. 1000 milliLiter(s) (100 mL/Hr) IV Continuous <Continuous>    MEDICATIONS  (PRN):  acetaminophen     Tablet .. 650 milliGRAM(s) Oral every 6 hours PRN Temp greater or equal to 38C (100.4F), Mild Pain (1 - 3)  aluminum hydroxide/magnesium hydroxide/simethicone Suspension 30 milliLiter(s) Oral every 4 hours PRN Dyspepsia  melatonin 3 milliGRAM(s) Oral at bedtime PRN Insomnia  ondansetron Injectable 4 milliGRAM(s) IV Push every 8 hours PRN Nausea and/or Vomiting    Allergies    No Known Allergies    Intolerances    PHYSICAL EXAM  General: No acute distress, resting comfortably in bed.  HEENT: Normocephalic atraumatic  Respiratory: Nonlabored respirations  Cardio: regular rate  Abdomen: soft, nondistended, nontender  Extremities: warm and well perfused      Vital Signs Last 24 Hrs  T(C): 36.6 (04 Mar 2024 05:45), Max: 36.8 (03 Mar 2024 21:15)  T(F): 97.9 (04 Mar 2024 05:45), Max: 98.3 (03 Mar 2024 21:15)  HR: 81 (04 Mar 2024 05:45) (76 - 86)  BP: 100/66 (04 Mar 2024 05:45) (100/66 - 118/80)  BP(mean): --  RR: 17 (04 Mar 2024 05:45) (17 - 18)  SpO2: 100% (04 Mar 2024 05:45) (99% - 100%)    Parameters below as of 04 Mar 2024 05:45  Patient On (Oxygen Delivery Method): room air      Daily     Daily                             8.7    5.31  )-----------( 272      ( 04 Mar 2024 04:00 )             27.5     03-04    137  |  107  |  6<L>  ----------------------------<  83  3.8   |  23  |  0.86    Ca    8.0<L>      04 Mar 2024 04:00  Phos  2.5     03-04  Mg     2.10     03-04    TPro  7.7  /  Alb  3.3  /  TBili  0.3  /  DBili  x   /  AST  17  /  ALT  19  /  AlkPhos  129<H>  03-02    PT/INR - ( 04 Mar 2024 04:00 )   PT: 14.2 sec;   INR: 1.27 ratio         PTT - ( 04 Mar 2024 04:00 )  PTT:34.6 sec  Urinalysis Basic - ( 04 Mar 2024 04:00 )    Color: x / Appearance: x / SG: x / pH: x  Gluc: 83 mg/dL / Ketone: x  / Bili: x / Urobili: x   Blood: x / Protein: x / Nitrite: x   Leuk Esterase: x / RBC: x / WBC x   Sq Epi: x / Non Sq Epi: x / Bacteria: x        IMAGING STUDIES:

## 2024-03-04 NOTE — PROGRESS NOTE ADULT - PROBLEM SELECTOR PLAN 2
-C/f perianal fistula on CT: enhancing mass versus collapsed collection within the left medial gluteal fold that appears to extend to the posterior aspect of the internal sphincter;  tract within the   right medial gluteal fold.   -Recommend further evaluation with MRI   [] f/u MRI  [] consider colorectal surgery consult if definitive -C/f perianal fistula on CT: enhancing mass versus collapsed collection within the left medial gluteal fold that appears to extend to the posterior aspect of the internal sphincter;  tract within the   right medial gluteal fold.   -Recommend further evaluation with MRI   [] f/u MRI  [] colorectal surgery consult if definitive

## 2024-03-04 NOTE — PRE-OP CHECKLIST - NEEDLE GAUGE
Appointment and order(s) canceled  
Patient scheduled for EGD/COLON on 4/12/2024   Patient has been cancelled due to bump    Notified patient  that we will call back to schedule once availability opens May 2024.   Patient agreeable with that.    Postponing message  to call patient back then    Patient has been cancelled.   Patient was scheduled on 4/12/2024 at 3PM   Reason: MD OUT OF OFFICE   Message sent to Endo  to cancel appointment.   
20

## 2024-03-04 NOTE — CONSULT NOTE ADULT - ASSESSMENT
44 YO M hx of HS and hemorrhoids p/w 1 day of bloody BM. Active extravasation on CT scan at mid descending colon. Question of left colon mass. Colorectal surgery consulted for eval.    Plan/recs  - Follow-up colonoscopy results  - care per primary    Surgery Team A  [id number]

## 2024-03-04 NOTE — PROGRESS NOTE ADULT - PROBLEM SELECTOR PLAN 1
-Diverticulitis vs IBD vs. colorectal ca vs infectious   -Hx rectal bleed 1 y ago  due to hemorrhoids which self resolved.  Another episode in Nov  and was referred to GI but was unable to f/u. No external hemorrhoids on exam.  -CTAP 3/2 showed descending colon active bleed w/extravasation, c/f underlying mass, recommend further evaluation with colonoscopy  -Infectious workup: HIV neg, treponema Ab neg, f/u remaining STD panel  -Surgery recommends conservative treatment. Per IR, no indication for embolization currently as patient is HD stable  -S/p 1 unit pRBC on 3/2  -c/w protonix 40mg BID  -GI following, appreciate recs. Bowel prep completed, NPO for possible colonoscopy today -Diverticulitis vs IBD vs. colorectal ca vs infectious   -Hx rectal bleed 1 y ago  due to hemorrhoids which self resolved.  Another episode in Nov  and was referred to GI but was unable to f/u. No external hemorrhoids on exam.  -CTAP 3/2 showed descending colon active bleed w/extravasation, c/f underlying mass, recommend further evaluation with colonoscopy  -Infectious workup: HIV neg, treponema Ab neg, f/u remaining STD panel  -Surgery recommends conservative treatment. Per IR, no indication for embolization currently as patient is HD stable  -S/p 1 unit pRBC on 3/2  -c/w protonix 40mg BID  -GI following, appreciate recs  -colonoscopy 3/4: ulcerated, non-obstructing lesion and oozing, 7mm pedunculated polyp in sigmoid - r/o malignancy/tumor in descending colon  - f/u biopsy results - if unrevealing, would repeat in short interval to re-attempt  - f/u CEA  - obtaining CT Chest w/ IV contrast  - colorectal surgery following

## 2024-03-04 NOTE — PROGRESS NOTE ADULT - ASSESSMENT
42 YO M hx of HS and hemorrhoids p/w 1 day of bloody BM. Active extravasation on CT scan at mid descending colon.  Surgery consulted for eval.    Plan/recs  - Follow-up colonoscopy results  - care per primary    Surgery Team B  [id number]

## 2024-03-04 NOTE — CONSULT NOTE ADULT - ATTENDING COMMENTS
Lower GI bleed with newly diagnosed colon mass  -f/u pathology  -medical optimization  -CEA  -Chest CT
44 yo M pmh obesity, hidradenitis suppurative (s/p skin grafts in bl axilla and nape) presenting with acute BRBPR.  CT scan showing suspected bleeding colon but also with perianal fistula with collection to gluteal lesion.  This gluteal collection on CT correlates to area of hidradenitis and submucosal collection that patient says is part of hiadrenitis process. No overt fistual seen on anal exam, but skin tag seen.    Will need colonoscopy evaluation for bleed as well as to r/o IBD  Agree with plan for pelvis MRI as well

## 2024-03-04 NOTE — PRE-OP CHECKLIST - NSBLOODTRANS_GEN_A_CORE_SIUH
Pt states she has intermittent left side abdomina pain since this morning with nausea and states had five episode of vomiting pt also states her left arm feels cold radial pulse is present skin is warm and dry. no...

## 2024-03-05 ENCOUNTER — NON-APPOINTMENT (OUTPATIENT)
Age: 44
End: 2024-03-05

## 2024-03-05 ENCOUNTER — TRANSCRIPTION ENCOUNTER (OUTPATIENT)
Age: 44
End: 2024-03-05

## 2024-03-05 VITALS
TEMPERATURE: 98 F | OXYGEN SATURATION: 100 % | SYSTOLIC BLOOD PRESSURE: 138 MMHG | RESPIRATION RATE: 17 BRPM | HEART RATE: 93 BPM | DIASTOLIC BLOOD PRESSURE: 85 MMHG

## 2024-03-05 LAB
ALBUMIN SERPL ELPH-MCNC: 3.1 G/DL — LOW (ref 3.3–5)
ALP SERPL-CCNC: 125 U/L — HIGH (ref 40–120)
ALT FLD-CCNC: 12 U/L — SIGNIFICANT CHANGE UP (ref 4–41)
ANION GAP SERPL CALC-SCNC: 10 MMOL/L — SIGNIFICANT CHANGE UP (ref 7–14)
AST SERPL-CCNC: 15 U/L — SIGNIFICANT CHANGE UP (ref 4–40)
BASOPHILS # BLD AUTO: 0.01 K/UL — SIGNIFICANT CHANGE UP (ref 0–0.2)
BASOPHILS NFR BLD AUTO: 0.1 % — SIGNIFICANT CHANGE UP (ref 0–2)
BILIRUB SERPL-MCNC: <0.2 MG/DL — SIGNIFICANT CHANGE UP (ref 0.2–1.2)
BUN SERPL-MCNC: 8 MG/DL — SIGNIFICANT CHANGE UP (ref 7–23)
CALCIUM SERPL-MCNC: 8.3 MG/DL — LOW (ref 8.4–10.5)
CEA SERPL-MCNC: 1 NG/ML — SIGNIFICANT CHANGE UP (ref 1–3.8)
CHLORIDE SERPL-SCNC: 106 MMOL/L — SIGNIFICANT CHANGE UP (ref 98–107)
CO2 SERPL-SCNC: 24 MMOL/L — SIGNIFICANT CHANGE UP (ref 22–31)
CREAT SERPL-MCNC: 0.91 MG/DL — SIGNIFICANT CHANGE UP (ref 0.5–1.3)
EGFR: 106 ML/MIN/1.73M2 — SIGNIFICANT CHANGE UP
EOSINOPHIL # BLD AUTO: 0.11 K/UL — SIGNIFICANT CHANGE UP (ref 0–0.5)
EOSINOPHIL NFR BLD AUTO: 1.6 % — SIGNIFICANT CHANGE UP (ref 0–6)
GLUCOSE SERPL-MCNC: 99 MG/DL — SIGNIFICANT CHANGE UP (ref 70–99)
HCT VFR BLD CALC: 29.6 % — LOW (ref 39–50)
HGB BLD-MCNC: 9 G/DL — LOW (ref 13–17)
IANC: 5.11 K/UL — SIGNIFICANT CHANGE UP (ref 1.8–7.4)
IMM GRANULOCYTES NFR BLD AUTO: 0.3 % — SIGNIFICANT CHANGE UP (ref 0–0.9)
LYMPHOCYTES # BLD AUTO: 0.72 K/UL — LOW (ref 1–3.3)
LYMPHOCYTES # BLD AUTO: 10.7 % — LOW (ref 13–44)
MAGNESIUM SERPL-MCNC: 2.3 MG/DL — SIGNIFICANT CHANGE UP (ref 1.6–2.6)
MCHC RBC-ENTMCNC: 21.9 PG — LOW (ref 27–34)
MCHC RBC-ENTMCNC: 30.4 GM/DL — LOW (ref 32–36)
MCV RBC AUTO: 72 FL — LOW (ref 80–100)
MONOCYTES # BLD AUTO: 0.73 K/UL — SIGNIFICANT CHANGE UP (ref 0–0.9)
MONOCYTES NFR BLD AUTO: 10.9 % — SIGNIFICANT CHANGE UP (ref 2–14)
NEUTROPHILS # BLD AUTO: 5.11 K/UL — SIGNIFICANT CHANGE UP (ref 1.8–7.4)
NEUTROPHILS NFR BLD AUTO: 76.4 % — SIGNIFICANT CHANGE UP (ref 43–77)
NRBC # BLD: 0 /100 WBCS — SIGNIFICANT CHANGE UP (ref 0–0)
NRBC # FLD: 0 K/UL — SIGNIFICANT CHANGE UP (ref 0–0)
PHOSPHATE SERPL-MCNC: 2.5 MG/DL — SIGNIFICANT CHANGE UP (ref 2.5–4.5)
PLATELET # BLD AUTO: 291 K/UL — SIGNIFICANT CHANGE UP (ref 150–400)
POTASSIUM SERPL-MCNC: 4 MMOL/L — SIGNIFICANT CHANGE UP (ref 3.5–5.3)
POTASSIUM SERPL-SCNC: 4 MMOL/L — SIGNIFICANT CHANGE UP (ref 3.5–5.3)
PROT SERPL-MCNC: 7.1 G/DL — SIGNIFICANT CHANGE UP (ref 6–8.3)
RBC # BLD: 4.11 M/UL — LOW (ref 4.2–5.8)
RBC # FLD: 17.3 % — HIGH (ref 10.3–14.5)
SODIUM SERPL-SCNC: 140 MMOL/L — SIGNIFICANT CHANGE UP (ref 135–145)
WBC # BLD: 6.7 K/UL — SIGNIFICANT CHANGE UP (ref 3.8–10.5)
WBC # FLD AUTO: 6.7 K/UL — SIGNIFICANT CHANGE UP (ref 3.8–10.5)

## 2024-03-05 PROCEDURE — 99231 SBSQ HOSP IP/OBS SF/LOW 25: CPT

## 2024-03-05 PROCEDURE — 99239 HOSP IP/OBS DSCHRG MGMT >30: CPT | Mod: GC

## 2024-03-05 RX ORDER — POLYETHYLENE GLYCOL 3350 17 G/17G
17 POWDER, FOR SOLUTION ORAL
Qty: 1 | Refills: 0
Start: 2024-03-05 | End: 2024-04-03

## 2024-03-05 RX ORDER — FERROUS SULFATE 325(65) MG
1 TABLET ORAL
Qty: 30 | Refills: 0
Start: 2024-03-05 | End: 2024-04-03

## 2024-03-05 NOTE — DISCHARGE NOTE NURSING/CASE MANAGEMENT/SOCIAL WORK - NSDCFUADDAPPT_GEN_ALL_CORE_FT
APPTS ARE READY TO BE MADE: [X] YES    Best Family or Patient Contact (if needed):    Additional Information about above appointments (if needed):    1: Colorectal surgery - Dr Fung  2: Gastroenterology clinic  3:     Other comments or requests:

## 2024-03-05 NOTE — PROGRESS NOTE ADULT - ATTENDING COMMENTS
Pt without bloody BMs today - would continue to check cbc [id number].  Pt hemodynamically stable.  Appreciate surg and gi recs - likely c-scope in am.  no role for IR embolization at this time.
43 year old male with a history of hidradenitis suppurative, recurrent lower GI bleeding presenting with BRBPR and acute blood loss anemia.    patient seen and examined at bedside after returning from colonoscopy. feels well, asking if he can eat since he is hungry. he is aware of the colonoscopy findings which showed a mass which could be cancer.    #BRBPR, acute blood loss anemia  - GI following, s/p colonoscopy today which showed a mass in the descending colon  - f/u pathology  - colorectal surgery consulted  - CT chest w/IV contrast pending to evaluate for metastases  - c/w IV iron for iron deficiency  - Hgb stable in 8-9 range  - monitor CBC, transfuse if Hgb<7    d/w HS 5
43 year old male with a history of hidradenitis suppurative, recurrent lower GI bleeding presenting with BRBPR and acute blood loss anemia.    patient seen and examined at bedside. pt feels well, no further bleeding noted, has some discomfort in his abdomen after eating.    #BRBPR, acute blood loss anemia  - GI following, s/p colonoscopy which showed a mass in the descending colon  - f/u pathology  - colorectal surgery consulted  - CT chest with no evidence of metastases  - CEA normal  - c/w IV iron for iron deficiency  - Hgb stable in 8-9 range  - monitor CBC, transfuse if Hgb<7    pending MRI pelvis results, likely discharge home today with outpatient GI and colorectal surgery follow up    d/w HS 5

## 2024-03-05 NOTE — PROGRESS NOTE ADULT - PROBLEM SELECTOR PLAN 2
-C/f perianal fistula on CT: enhancing mass versus collapsed collection within the left medial gluteal fold that appears to extend to the posterior aspect of the internal sphincter;  tract within the   right medial gluteal fold.   -Recommend further evaluation with MRI   [] f/u MRI read  [] colorectal surgery consult if definitive

## 2024-03-05 NOTE — PROGRESS NOTE ADULT - ASSESSMENT
42 YO M hx of HS and hemorrhoids p/w 1 day of bloody BM. Active extravasation on CT scan at mid descending colon. Question of left colon mass. Colorectal surgery consulted for eval.    Plan/recs  - Follow-up colonoscopy results  - care per primary  - Pending cancer markers    Surgery Team A  [id number]

## 2024-03-05 NOTE — PROGRESS NOTE ADULT - PROBLEM SELECTOR PROBLEM 3
Low mean corpuscular volume (MCV)

## 2024-03-05 NOTE — DISCHARGE NOTE NURSING/CASE MANAGEMENT/SOCIAL WORK - NSDCPEFALRISK_GEN_ALL_CORE
A closure device system was utilized/deployed for pre-closure of the right femoral vein access site using a DEVICE [facility] PROSTYLE S[facility] MEDIATE [facility] - [id number]. For information on Fall & Injury Prevention, visit: [email or web address] OR  [email or web address] OR  https://www.cdc.gov/steadi/patient.html

## 2024-03-05 NOTE — DISCHARGE NOTE NURSING/CASE MANAGEMENT/SOCIAL WORK - PATIENT PORTAL LINK FT
You can access the FollowMyHealth Patient Portal offered by [facility] by registering at the following website: [email or web address]. By joining [email or web address]’s FollowMyHealth portal, you will also be able to view your health information using other applications (apps) compatible with our system.

## 2024-03-05 NOTE — PROGRESS NOTE ADULT - PROBLEM SELECTOR PLAN 3
-MCV 70-71  -Iron deficiency anemia: lron 13 with % saturation 6; likely i/s/o acute blood loss anemia  -c/w IV iron x3 days until 3/6
-MCV 70-71  -Iron deficiency anemia: lron 13 with % saturation 6; likely i/s/o acute blood loss anemia  -c/w IV iron x3 days until 3/6
-MCV 70-71  -Iron deficiency anemia: lron 13 with % saturation 6; likely i/s/o acute blood loss anemia

## 2024-03-05 NOTE — PROGRESS NOTE ADULT - NS ATTEND AMEND GEN_ALL_CORE FT
As above.    Discussed with GI PA on  3/5/24 in the afternoon.    Impression:    #1.  Sigmoid colon mass, suspicious for malignancy, status post colonoscopy 3/4/2024 with biopsy.    #2.  Cross-sectional imaging demonstrates gluteal mass, patient with history of hidradenitis suppurativa    Recommendations:    #1.  Await pathology results.    #2.  MRI of the pelvis to further characterize gluteal mass.    #3.  Follow-up with colorectal surgery and medical oncology.

## 2024-03-05 NOTE — PROGRESS NOTE ADULT - PROBLEM SELECTOR PLAN 1
-Diverticulitis vs IBD vs. colorectal ca vs infectious   -Hx rectal bleed 1 y ago  due to hemorrhoids which self resolved.  Another episode in Nov  and was referred to GI but was unable to f/u. No external hemorrhoids on exam.  -CTAP 3/2 showed descending colon active bleed w/extravasation, c/f underlying mass, recommend further evaluation with colonoscopy  -Infectious workup: HIV neg, treponema Ab neg, f/u remaining STD panel  -Surgery recommends conservative treatment. Per IR, no indication for embolization currently as patient is HD stable  -S/p 1 unit pRBC on 3/2  -c/w protonix 40mg BID  -GI following, appreciate recs  - colonoscopy 3/4: ulcerated, non-obstructing lesion and oozing, 7mm pedunculated polyp in sigmoid - r/o malignancy/tumor in descending colon  - f/u biopsy results - if unrevealing, GI would repeat scope in short interval to re-attempt  - f/u CEA  - CT Chest w/ IV contrast - no signs of metastases  - colorectal surgery following -Diverticulitis vs IBD vs. colorectal ca vs infectious   -Hx rectal bleed 1 y ago  due to hemorrhoids which self resolved.  Another episode in Nov  and was referred to GI but was unable to f/u. No external hemorrhoids on exam.  -CTAP 3/2 showed descending colon active bleed w/extravasation, c/f underlying mass, recommend further evaluation with colonoscopy  -Infectious workup: HIV neg, treponema Ab neg, f/u remaining STD panel  -Surgery recommends conservative treatment. Per IR, no indication for embolization currently as patient is HD stable  -S/p 1 unit pRBC on 3/2  -c/w protonix 40mg BID  -GI following, appreciate recs  - colonoscopy 3/4: ulcerated, non-obstructing lesion and oozing, 7mm pedunculated polyp in sigmoid - r/o malignancy/tumor in descending colon  - f/u biopsy results - if unrevealing, GI would repeat scope in short interval to re-attempt  - CEA wnl   - CT Chest w/ IV contrast - no signs of metastases  - colorectal surgery following

## 2024-03-05 NOTE — PROGRESS NOTE ADULT - PROBLEM SELECTOR PLAN 4
DVT- held i/s/o active bleed - may need some AC iso poss malignancy  GI -not indicate  Diet- NPO, advance as per GI orders  Dispo_ pending clinical course DVT- held i/s/o active bleed - refused SCDs  GI - PPI not indicated  Diet- regular  Dispo_ pending clinical course

## 2024-03-05 NOTE — PROGRESS NOTE ADULT - SUBJECTIVE AND OBJECTIVE BOX
Surgery Daily Progress Note  =====================================================  SUBJECTIVE: A 43y Male Patient seen and examined at bedside on AM rounds. No acute events overnight. Patient denies abdominal pain, nausea or vomiting.    ALLERGIES:  No Known Allergies      --------------------------------------------------------------------------------------    MEDICATIONS:    Neurologic Medications  acetaminophen     Tablet .. 650 milliGRAM(s) Oral every 6 hours PRN Temp greater or equal to 38C (100.4F), Mild Pain (1 - 3)  melatonin 3 milliGRAM(s) Oral at bedtime PRN Insomnia  ondansetron Injectable 4 milliGRAM(s) IV Push every 8 hours PRN Nausea and/or Vomiting    Respiratory Medications    Cardiovascular Medications    Gastrointestinal Medications  aluminum hydroxide/magnesium hydroxide/simethicone Suspension 30 milliLiter(s) Oral every 4 hours PRN Dyspepsia  iron sucrose IVPB 300 milliGRAM(s) IV Intermittent every 24 hours    Genitourinary Medications    Hematologic/Oncologic Medications    Antimicrobial/Immunologic Medications    Endocrine/Metabolic Medications    Topical/Other Medications    --------------------------------------------------------------------------------------    VITAL SIGNS:  No Known Allergies      T(C): 36.6 (03-05-24 @ 05:42), Max: 36.8 (03-04-24 @ 10:21)  HR: 78 (03-05-24 @ 05:42) (77 - 94)  BP: 131/64 (03-05-24 @ 05:42) (105/68 - 131/64)  RR: 18 (03-05-24 @ 05:42) (15 - 19)  SpO2: 97% (03-05-24 @ 05:42) (96% - 100%)  --------------------------------------------------------------------------------------    PHYSICAL EXAM  General: No acute distress, resting comfortably in bed.  HEENT: Normocephalic atraumatic  Respiratory: Nonlabored respirations  Cardio: regular rate  Abdomen: soft, nondistended, nontender  Extremities: warm and well perfused    --------------------------------------------------------------------------------------    I&Os  T(C): 36.6 (03-05-24 @ 05:42), Max: 36.8 (03-04-24 @ 10:21)  HR: 78 (03-05-24 @ 05:42) (77 - 94)  BP: 131/64 (03-05-24 @ 05:42) (105/68 - 131/64)  RR: 18 (03-05-24 @ 05:42) (15 - 19)  SpO2: 97% (03-05-24 @ 05:42) (96% - 100%)  --------------------------------------------------------------------------------------    LABS                          9.0    6.70  )-----------( 291      ( 05 Mar 2024 06:57 )             29.6     03-05    140  |  106  |  8   ----------------------------<  99  4.0   |  24  |  0.91    Ca    8.3<L>      05 Mar 2024 06:57  Phos  2.5     03-05  Mg     2.30     03-05    TPro  7.1  /  Alb  3.1<L>  /  TBili  <0.2  /  DBili  x   /  AST  15  /  ALT  12  /  AlkPhos  125<H>  03-05    PT/INR - ( 04 Mar 2024 04:00 )   PT: 14.2 sec;   INR: 1.27 ratio         PTT - ( 04 Mar 2024 04:00 )  PTT:34.6 sec  Urinalysis Basic - ( 05 Mar 2024 06:57 )    Color: x / Appearance: x / SG: x / pH: x  Gluc: 99 mg/dL / Ketone: x  / Bili: x / Urobili: x   Blood: x / Protein: x / Nitrite: x   Leuk Esterase: x / RBC: x / WBC x   Sq Epi: x / Non Sq Epi: x / Bacteria: x        03-04-24 @ 07:01  -  03-05-24 @ 07:00  --------------------------------------------------------  IN: 480 mL / OUT: 800 mL / NET: -320 mL      acetaminophen     Tablet .. 650 milliGRAM(s) Oral every 6 hours PRN  aluminum hydroxide/magnesium hydroxide/simethicone Suspension 30 milliLiter(s) Oral every 4 hours PRN  iron sucrose IVPB 300 milliGRAM(s) IV Intermittent every 24 hours  melatonin 3 milliGRAM(s) Oral at bedtime PRN  ondansetron Injectable 4 milliGRAM(s) IV Push every 8 hours PRN      RADIOLOGY, EKG & ADDITIONAL TESTS: Reviewed. 
Interval Events:  sp colon yesterday. pt seen and examined. c/o 'fullness,' passing gas, otherwise denies n/v/abd pain. tolerating some po. CT chest neg for mets, cea 1      Allergies:  No Known Allergies      Hospital Medications:  acetaminophen     Tablet .. 650 milliGRAM(s) Oral every 6 hours PRN  aluminum hydroxide/magnesium hydroxide/simethicone Suspension 30 milliLiter(s) Oral every 4 hours PRN  iron sucrose IVPB 300 milliGRAM(s) IV Intermittent every 24 hours  melatonin 3 milliGRAM(s) Oral at bedtime PRN  ondansetron Injectable 4 milliGRAM(s) IV Push every 8 hours PRN      ROS: As per HPI, otherwise 10-point ROS reviewed and negative.    Vital Signs:  Vital Signs Last 24 Hrs  T(C): 36.6 (05 Mar 2024 05:42), Max: 36.8 (04 Mar 2024 10:21)  T(F): 97.9 (05 Mar 2024 05:42), Max: 98.3 (04 Mar 2024 10:21)  HR: 78 (05 Mar 2024 05:42) (77 - 94)  BP: 131/64 (05 Mar 2024 05:42) (105/68 - 131/64)  BP(mean): --  RR: 18 (05 Mar 2024 05:42) (15 - 19)  SpO2: 97% (05 Mar 2024 05:42) (96% - 100%)    Parameters below as of 05 Mar 2024 05:42  Patient On (Oxygen Delivery Method): room air      Daily     Daily       03-04-24 @ 07:01  -  03-05-24 @ 07:00  --------------------------------------------------------  IN: 480 mL / OUT: 800 mL / NET: -320 mL        LABS:                        9.0    6.70  )-----------( 291      ( 05 Mar 2024 06:57 )             29.6     Mean Cell Volume: 72.0 fL (03-05-24 @ 06:57)    03-05    140  |  106  |  8   ----------------------------<  99  4.0   |  24  |  0.91    Ca    8.3<L>      05 Mar 2024 06:57  Phos  2.5     03-05  Mg     2.30     03-05    TPro  7.1  /  Alb  3.1<L>  /  TBili  <0.2  /  DBili  x   /  AST  15  /  ALT  12  /  AlkPhos  125<H>  03-05    LIVER FUNCTIONS - ( 05 Mar 2024 06:57 )  Alb: 3.1 g/dL / Pro: 7.1 g/dL / ALK PHOS: 125 U/L / ALT: 12 U/L / AST: 15 U/L / GGT: x           PT/INR - ( 04 Mar 2024 04:00 )   PT: 14.2 sec;   INR: 1.27 ratio         PTT - ( 04 Mar 2024 04:00 )  PTT:34.6 sec  Urinalysis Basic - ( 05 Mar 2024 06:57 )    Color: x / Appearance: x / SG: x / pH: x  Gluc: 99 mg/dL / Ketone: x  / Bili: x / Urobili: x   Blood: x / Protein: x / Nitrite: x   Leuk Esterase: x / RBC: x / WBC x   Sq Epi: x / Non Sq Epi: x / Bacteria: x                              9.0    6.70  )-----------( 291      ( 05 Mar 2024 06:57 )             29.6                         8.7    5.31  )-----------( 272      ( 04 Mar 2024 04:00 )             27.5                         9.6    5.64  )-----------( 288      ( 03 Mar 2024 22:50 )             31.3                         9.1    5.29  )-----------( 278      ( 03 Mar 2024 16:04 )             28.9                         9.4    5.97  )-----------( 284      ( 03 Mar 2024 07:46 )             29.5       PHYSICAL EXAM  GENERAL: lying in bed, in no apparent distress  HEENT: NCAT  EYES: anicteric sclerae, moist conjunctivae  NECK: trachea midline, FROM, neck supple  CHEST:  respirations even, non labored  ABDOMEN:  softly distended, non-tender  EXTREMITIES: WWP  SKIN:  warm/dry  PSYCH: appropriate affect, A&O x 3  NEURO: no tremor noted         [facility]  _______________________________________________________________________________  Patient Name: Dez Sanders           Procedure Date: 3/4/2024 10:59 AM  MRN: [id number]                     Account Number: [id number]  YOB: 1980              Admit Type: Inpatient  Room: [place] [address]                         Gender: Male  Attending MD: MARC DE LA TORRE MD    _______________________________________________________________________________     Procedure:           Colonoscopy  Indications:         Hematochezia  Providers:           MARC DE LA TORRE MD  Medicines:           Monitored Anesthesia Care  Complications:       No immediate complications.  Procedure:           Pre-Anesthesia Assessment:                      - Prior to the procedure, a History and Physical was                        performed, and patient medications and allergies were                        reviewed. The patient's tolerance of previous anesthesia     was also reviewed. The risks and benefits of the                        procedure and the sedation options and risks were                        discussed with the patient. All questions were answered,                        and informed consent was obtained. Prior Anticoagulants:                        The patient has taken no previous anticoagulant or                        antiplatelet agents. ASA Grade Assessment: III - A                        patient with severe systemic disease. After reviewing                        the risks and benefits, the patient was deemed in                        satisfactory condition to undergo the procedure.                       After I obtained informed consent, the scope was passed     under direct vision. Throughout the procedure, the                        patient's blood pressure, pulse, and oxygen saturations                        were monitored continuously. The Colonoscope was                        introduced through the anus and advanced to the cecum,                        identified by appendiceal orifice and ileocecal valve.                        The colonoscopy was performed without difficulty. The                        patient tolerated the procedure well. Thequality of the                        bowel preparation was adequate to identify polyps 6 mm                        and larger in size.                                                                                   Findings:       40cm from the anal verge, noted an area of congestion and erythema that        did not insufflate well with CO2 and air. The endoscope was then        withdrawn and an endoscopic cap was placed. The cap allowed for limited        but improved visualization of the area. An ulcerated non-obstructing,        circumferential lesion was seen. Oozing was present. Biopsies were taken        with a cold forceps for histology under limited visualization. 3cm        distal to the lesion, an area was tattooed with an injection of Ying        ink.       A 7 mm polyp was found in the sigmoid colon. The polyp was pedunculated.        Polypectomy was not attempted due to the identification of possible        cancer/colitis. The top of the polyp was removed due to the plastic        endoscopic cap, and was not retrieved.       The exam was otherwise normal throughout the examined colon.                                                                                   Impression:          - Rule out malignancy, tumor in the descending colon.                        Biopsied. Tattooed. DDx includes segmental colitis vs.                        malignancy.                       - One 7 mm polyp in the sigmoid colon. Resection not                        attempted.  Recommendation:      - Return patient to hospital [facility] for ongoing care.                       - f/u pathology. If biopsies are unrevealing would                        repeat the colonsocopy within a short interval to                        re-attempt.                    - CEA                       - CT chest with IV contrast.                       - Diet as tolerated.                                                                                   Attending Participation:       I personally performed the entire procedure.                                                                                   
  SURGERY PROGRESS NOTE    Interval events  - Eval by GI > plan for colonoscopy today. Prepped overnight. Stool liquid yellow.  - Eval by IR > question whether bleed vs mass. no intervention for now.   - No acute events overnight.  - Vital signs stable, afebrile, on room air.         OBJECTIVE:   Vital Signs Last 24 Hrs  T(C): 36.6 (04 Mar 2024 05:45), Max: 36.8 (03 Mar 2024 21:15)  T(F): 97.9 (04 Mar 2024 05:45), Max: 98.3 (03 Mar 2024 21:15)  HR: 81 (04 Mar 2024 05:45) (76 - 87)  BP: 100/66 (04 Mar 2024 05:45) (100/66 - 124/77)  BP(mean): --  RR: 17 (04 Mar 2024 05:45) (17 - 18)  SpO2: 100% (04 Mar 2024 05:45) (99% - 100%)    Parameters below as of 04 Mar 2024 05:45  Patient On (Oxygen Delivery Method): room air            I&O's Summary    02 Mar 2024 07:01  -  03 Mar 2024 07:00  --------------------------------------------------------  IN: 600 mL / OUT: 450 mL / NET: 150 mL      I&O's Detail    02 Mar 2024 07:01  -  03 Mar 2024 07:00  --------------------------------------------------------  IN:    sodium chloride 0.9%: 600 mL  Total IN: 600 mL    OUT:    Voided (mL): 450 mL  Total OUT: 450 mL    Total NET: 150 mL          MEDICATIONS  (STANDING):  iron sucrose IVPB 300 milliGRAM(s) IV Intermittent every 24 hours  pantoprazole  Injectable 40 milliGRAM(s) IV Push every 12 hours  sodium chloride 0.9%. 1000 milliLiter(s) (100 mL/Hr) IV Continuous <Continuous>    MEDICATIONS  (PRN):  acetaminophen     Tablet .. 650 milliGRAM(s) Oral every 6 hours PRN Temp greater or equal to 38C (100.4F), Mild Pain (1 - 3)  aluminum hydroxide/magnesium hydroxide/simethicone Suspension 30 milliLiter(s) Oral every 4 hours PRN Dyspepsia  melatonin 3 milliGRAM(s) Oral at bedtime PRN Insomnia  ondansetron Injectable 4 milliGRAM(s) IV Push every 8 hours PRN Nausea and/or Vomiting    PHYSICAL EXAM  General: No acute distress, resting comfortably in bed.  HEENT: Normocephalic atraumatic  Respiratory: Nonlabored respirations  Cardio: regular rate  Abdomen: soft, nondistended, nontender  Extremities: warm and well perfused      LABS:                        8.7    5.31  )-----------( 272      ( 04 Mar 2024 04:00 )             27.5     03-04    137  |  107  |  6<L>  ----------------------------<  83  3.8   |  23  |  0.86    Ca    8.0<L>      04 Mar 2024 04:00  Phos  2.5     03-04  Mg     2.10     03-04    TPro  7.7  /  Alb  3.3  /  TBili  0.3  /  DBili  x   /  AST  17  /  ALT  19  /  AlkPhos  129<H>  03-02    PT/INR - ( 04 Mar 2024 04:00 )   PT: 14.2 sec;   INR: 1.27 ratio         PTT - ( 04 Mar 2024 04:00 )  PTT:34.6 sec  Urinalysis Basic - ( 04 Mar 2024 04:00 )    Color: x / Appearance: x / SG: x / pH: x  Gluc: 83 mg/dL / Ketone: x  / Bili: x / Urobili: x   Blood: x / Protein: x / Nitrite: x   Leuk Esterase: x / RBC: x / WBC x   Sq Epi: x / Non Sq Epi: x / Bacteria: x        RADIOLOGY & ADDITIONAL STUDIES:      
  SURGERY PROGRESS NOTE    Interval events  - No acute events.  - Vital signs stable, afebrile, on room air.   - no BMs    OBJECTIVE:   Vital Signs Last 24 Hrs  T(C): 36.3 (03 Mar 2024 07:08), Max: 37.1 (02 Mar 2024 21:40)  T(F): 97.4 (03 Mar 2024 07:08), Max: 98.8 (02 Mar 2024 21:40)  HR: 87 (03 Mar 2024 07:08) (81 - 108)  BP: 124/77 (03 Mar 2024 07:08) (103/60 - 124/77)  BP(mean): --  RR: 17 (03 Mar 2024 07:08) (17 - 18)  SpO2: 100% (03 Mar 2024 07:08) (100% - 100%)    Parameters below as of 03 Mar 2024 07:08  Patient On (Oxygen Delivery Method): room air            I&O's Summary    02 Mar 2024 07:01  -  03 Mar 2024 07:00  --------------------------------------------------------  IN: 600 mL / OUT: 450 mL / NET: 150 mL      I&O's Detail    02 Mar 2024 07:01  -  03 Mar 2024 07:00  --------------------------------------------------------  IN:    sodium chloride 0.9%: 600 mL  Total IN: 600 mL    OUT:    Voided (mL): 450 mL  Total OUT: 450 mL    Total NET: 150 mL          MEDICATIONS  (STANDING):  iron sucrose IVPB 300 milliGRAM(s) IV Intermittent every 24 hours  pantoprazole  Injectable 40 milliGRAM(s) IV Push every 12 hours  sodium chloride 0.9%. 1000 milliLiter(s) (100 mL/Hr) IV Continuous <Continuous>    MEDICATIONS  (PRN):  acetaminophen     Tablet .. 650 milliGRAM(s) Oral every 6 hours PRN Temp greater or equal to 38C (100.4F), Mild Pain (1 - 3)  aluminum hydroxide/magnesium hydroxide/simethicone Suspension 30 milliLiter(s) Oral every 4 hours PRN Dyspepsia  melatonin 3 milliGRAM(s) Oral at bedtime PRN Insomnia  ondansetron Injectable 4 milliGRAM(s) IV Push every 8 hours PRN Nausea and/or Vomiting    PHYSICAL EXAM  General: No acute distress, resting comfortably in bed.  HEENT: Normocephalic atraumatic  Respiratory: Nonlabored respirations  Cardio: regular rate  Abdomen: soft, nondistended, nontender  Extremities: warm and well perfused     LABS:                        9.4    5.97  )-----------( 284      ( 03 Mar 2024 07:46 )             29.5     03-03    140  |  107  |  9   ----------------------------<  93  3.9   |  24  |  0.81    Ca    8.2<L>      03 Mar 2024 07:46  Phos  3.0     03-03  Mg     2.20     03-03    TPro  7.7  /  Alb  3.3  /  TBili  0.3  /  DBili  x   /  AST  17  /  ALT  19  /  AlkPhos  129<H>  03-02    PT/INR - ( 03 Mar 2024 07:46 )   PT: 14.1 sec;   INR: 1.27 ratio         PTT - ( 03 Mar 2024 07:46 )  PTT:30.7 sec  Urinalysis Basic - ( 03 Mar 2024 07:46 )    Color: x / Appearance: x / SG: x / pH: x  Gluc: 93 mg/dL / Ketone: x  / Bili: x / Urobili: x   Blood: x / Protein: x / Nitrite: x   Leuk Esterase: x / RBC: x / WBC x   Sq Epi: x / Non Sq Epi: x / Bacteria: x        RADIOLOGY & ADDITIONAL STUDIES:  < from: CT Abdomen and Pelvis w/ IV Cont (03.02.24 @ 18:50) >    ACC: [id number] EXAM:  CT ABDOMEN AND PELVIS IC   ORDERED BY: DESIRE ALMEIDA     PROCEDURE DATE:  03/02/2024          INTERPRETATION:  CLINICAL INFORMATION: Bright red blood per rectum,   evaluate for GI bleed. Also concerning rectal mass.    COMPARISON: None.    CONTRAST/COMPLICATIONS:  IV Contrast: Omnipaque 350  180 cc administered   20 cc discarded  Oral Contrast: NONE  Complications: None reported at time of study completion    PROCEDURE:  CT of the Abdomen and Pelvis was performed.  Precontrast, Arterial and Delayed phases were performed. The noncontrast   examination only covers the upper portion of the abdomen.  Sagittal and coronal reformats were performed.    FINDINGS:  LOWER CHEST: Within normal limits.    LIVER: There are a few small benign hepatic cysts measuring up to 1.9 cm   within segment 2 of the liver (image #13, series 305).  BILE DUCTS: Normal caliber.  GALLBLADDER: Within normal limits.  SPLEEN: Within normal limits.  PANCREAS: Within normal limits.  ADRENALS: Within normal limits.  KIDNEYS/URETERS: Within normal limits.    BLADDER: Within normal limits.  REPRODUCTIVE ORGANS: Prostate within normal limits.    BOWEL: Evaluation for active bleed is somewhat limited due to technical   error resulting in no precontrast imaging. There is an active bleed   within the mid descending colon and there is surrounding fat stranding as   well as apparent concentration within the medial wall. There is is an   irregular area of masslike enhancement measuring approximately5.2 x 5.4   x 8.7 cm within the left medial gluteal fold (image #117, series   303/axial images and image #80, series 608/sagittal images) extending to   the posterior aspect of the internal sphincter. No internal fluid is   appreciated. There also appears to be a tract within the right medial   gluteal fat that also extends anteriorly toward the sphincter complex   with an apparent drainage track within the superficial medial gluteal   fold (image #109, series 303). Appendix is normal.  PERITONEUM: No ascites.  VESSELS: Within normal limits.  RETROPERITONEUM/LYMPH NODES: No lymphadenopathy.  ABDOMINAL WALL: See above for valvular abnormalities. Otherwise   unremarkable.  BONES: Within normal limits.    IMPRESSION:  Active congestive distal bleed within the mid descending colon with   surrounding fat stranding and there appears to be concentration of   contrast within the medial wall. Further evaluation with colonoscopy is   recommended exclude underlying lesion.    There appears to be a enhancing mass versus collapsed collection within   the left medial gluteal fold that appears to extend to the posterior   aspect of the internal sphincter as well as an apparent tract within the   right medial gluteal fold. This could represent a mass, marked   hidradenitis/enhancing complex perianal/gluteal fistula. Further   evaluation with perianal fistula protocol MRI is recommended.    Findings were discussed with Dr. DESIRE ALMEIDA [phone] 3/2/2024 7:57   PM by Dr. Nelson with read backconfirmation.    --- End of Report ---            EVAN NELSON MD; Attending Radiologist  This document has been electronically signed. Mar  2 2024  8:00PM    < end of copied text >      
*******************************  Anyi Cortez MD (PGY-1)  Internal Medicine  Contact via [employer] TE[employer]  *******************************    INTERVAL HPI/OVERNIGHT EVENTS:  Admitted o/n for rectal bleed. Since admission patient has not had any bowel movements or blood per rectum.     OBJECTIVE  T(C): 36.3 (03-03-24 @ 07:08), Max: 37.1 (03-02-24 @ 21:40)  HR: 87 (03-03-24 @ 07:08) (81 - 108)  BP: 124/77 (03-03-24 @ 07:08) (103/60 - 124/77)  RR: 17 (03-03-24 @ 07:08) (17 - 18)  SpO2: 100% (03-03-24 @ 07:08) (100% - 100%)    03-02-24 @ 07:01  -  03-03-24 @ 07:00  --------------------------------------------------------  IN: 600 mL / OUT: 450 mL / NET: 150 mL        PHYSICAL EXAM  GENERAL: NAD, well-developed  EYES: EOMI, PERRLA, conjunctiva and sclera clear  NECK: Supple, No JVD  CHEST/LUNG: Clear to auscultation bilaterally; No wheeze  HEART: Regular rate and rhythm; s1+ s2+  ABDOMEN: Soft, Nontender, Nondistended;  increased Bowel sounds present  EXTREMITIES:, No clubbing, cyanosis, or edema  NEUROLOGY:AAOx3 non-focal  SKIN: No rashes or lesions, dry skin w/scabs on bl LE, skin grafts on bl axilla and nape, skin graft origin on thighs    IMAGING:    < from: CT Abdomen and Pelvis w/ IV Cont (03.02.24 @ 18:50) >  IMPRESSION:  Active congestive distal bleed within the mid descending colon with   surrounding fat stranding and there appears to be concentration of   contrast within the medial wall. Further evaluation with colonoscopy is   recommended exclude underlying lesion.    There appears to be a enhancing mass versus collapsed collection within   the left medial gluteal fold that appears to extend to the posterior   aspect of the internal sphincter as well as an apparent tract within the   right medial gluteal fold. This could represent a mass, marked   hidradenitis/enhancing complex perianal/gluteal fistula. Further   evaluation with perianal fistula protocol MRI is recommended.      < end of copied text >  
Patient is a 43y old  Male who presents with a chief complaint of rectal bleed (04 Mar 2024 06:53)      SUBJECTIVE / OVERNIGHT EVENTS:  No acute events overnight. Overnight Hgb's stable in the 9's. Completed bowel prep for colonoscopy today. Patient at this time denies fevers, chills, CP, SOB, nausea, vomiting, diarrhea, constipation, dysuria. Patient seen and examined at bedside.    MEDICATIONS  (STANDING):  iron sucrose IVPB 300 milliGRAM(s) IV Intermittent every 24 hours  pantoprazole  Injectable 40 milliGRAM(s) IV Push every 12 hours  sodium chloride 0.9%. 1000 milliLiter(s) (100 mL/Hr) IV Continuous <Continuous>    MEDICATIONS  (PRN):  acetaminophen     Tablet .. 650 milliGRAM(s) Oral every 6 hours PRN Temp greater or equal to 38C (100.4F), Mild Pain (1 - 3)  aluminum hydroxide/magnesium hydroxide/simethicone Suspension 30 milliLiter(s) Oral every 4 hours PRN Dyspepsia  melatonin 3 milliGRAM(s) Oral at bedtime PRN Insomnia  ondansetron Injectable 4 milliGRAM(s) IV Push every 8 hours PRN Nausea and/or Vomiting      CAPILLARY BLOOD GLUCOSE        I&O's Summary      PHYSICAL EXAM:  Vital Signs Last 24 Hrs  T(C): 36.6 (04 Mar 2024 05:45), Max: 36.8 (03 Mar 2024 21:15)  T(F): 97.9 (04 Mar 2024 05:45), Max: 98.3 (03 Mar 2024 21:15)  HR: 81 (04 Mar 2024 05:45) (76 - 86)  BP: 100/66 (04 Mar 2024 05:45) (100/66 - 118/80)  BP(mean): --  RR: 17 (04 Mar 2024 05:45) (17 - 18)  SpO2: 100% (04 Mar 2024 05:45) (99% - 100%)    Parameters below as of 04 Mar 2024 05:45  Patient On (Oxygen Delivery Method): room air        GENERAL: NAD, well-developed  EYES: EOMI, PERRLA, conjunctiva and sclera clear  NECK: Supple, No JVD  CHEST/LUNG: Clear to auscultation bilaterally; No wheeze  HEART: Regular rate and rhythm; s1+ s2+  ABDOMEN: Soft, Nontender, Nondistended;  increased Bowel sounds present  EXTREMITIES:, No clubbing, cyanosis, or edema  NEUROLOGY:AAOx3 non-focal  SKIN: No rashes or lesions, dry skin w/scabs on bl LE, skin grafts on bl axilla and nape, skin graft origin on thighs    LABS:                        8.7    5.31  )-----------( 272      ( 04 Mar 2024 04:00 )             27.5     03-04    137  |  107  |  6<L>  ----------------------------<  83  3.8   |  23  |  0.86    Ca    8.0<L>      04 Mar 2024 04:00  Phos  2.5     03-04  Mg     2.10     03-04    TPro  7.7  /  Alb  3.3  /  TBili  0.3  /  DBili  x   /  AST  17  /  ALT  19  /  AlkPhos  129<H>  03-02    PT/INR - ( 04 Mar 2024 04:00 )   PT: 14.2 sec;   INR: 1.27 ratio         PTT - ( 04 Mar 2024 04:00 )  PTT:34.6 sec      Urinalysis Basic - ( 04 Mar 2024 04:00 )    Color: x / Appearance: x / SG: x / pH: x  Gluc: 83 mg/dL / Ketone: x  / Bili: x / Urobili: x   Blood: x / Protein: x / Nitrite: x   Leuk Esterase: x / RBC: x / WBC x   Sq Epi: x / Non Sq Epi: x / Bacteria: x          RADIOLOGY & ADDITIONAL TESTS:  Results Reviewed:   Imaging Personally Reviewed:  Electrocardiogram Personally Reviewed:    COORDINATION OF CARE:  Care Discussed with Consultants/Other Providers [Y/N]:  Prior or Outpatient Records Reviewed [Y/N]:  
Patient is a 43y old  Male who presents with a chief complaint of rectal bleed (04 Mar 2024 07:44)      SUBJECTIVE / OVERNIGHT EVENTS:  No acute events overnight. Patient at this time denies fevers, chills, CP, SOB, nausea, vomiting, diarrhea, constipation, dysuria. No further rectal bleed. Patient seen and examined at bedside.    MEDICATIONS  (STANDING):  iron sucrose IVPB 300 milliGRAM(s) IV Intermittent every 24 hours    MEDICATIONS  (PRN):  acetaminophen     Tablet .. 650 milliGRAM(s) Oral every 6 hours PRN Temp greater or equal to 38C (100.4F), Mild Pain (1 - 3)  aluminum hydroxide/magnesium hydroxide/simethicone Suspension 30 milliLiter(s) Oral every 4 hours PRN Dyspepsia  melatonin 3 milliGRAM(s) Oral at bedtime PRN Insomnia  ondansetron Injectable 4 milliGRAM(s) IV Push every 8 hours PRN Nausea and/or Vomiting      CAPILLARY BLOOD GLUCOSE        I&O's Summary    04 Mar 2024 07:01  -  05 Mar 2024 07:00  --------------------------------------------------------  IN: 480 mL / OUT: 800 mL / NET: -320 mL        PHYSICAL EXAM:  Vital Signs Last 24 Hrs  T(C): 36.6 (05 Mar 2024 05:42), Max: 36.8 (04 Mar 2024 10:21)  T(F): 97.9 (05 Mar 2024 05:42), Max: 98.3 (04 Mar 2024 10:21)  HR: 78 (05 Mar 2024 05:42) (77 - 94)  BP: 131/64 (05 Mar 2024 05:42) (105/68 - 131/64)  BP(mean): --  RR: 18 (05 Mar 2024 05:42) (15 - 19)  SpO2: 97% (05 Mar 2024 05:42) (96% - 100%)    Parameters below as of 05 Mar 2024 05:42  Patient On (Oxygen Delivery Method): room air        GENERAL: NAD, well-developed  EYES: EOMI, PERRLA, conjunctiva and sclera clear  NECK: Supple, No JVD  CHEST/LUNG: Clear to auscultation bilaterally; No wheeze  HEART: Regular rate and rhythm; s1+ s2+  ABDOMEN: Soft, Nontender, Nondistended;  increased Bowel sounds present  EXTREMITIES:, No clubbing, cyanosis, or edema  NEUROLOGY:AAOx3 non-focal  SKIN: No rashes or lesions, dry skin w/scabs on bl LE, skin grafts on bl axilla and nape, skin graft origin on thighs    LABS:                        8.7    5.31  )-----------( 272      ( 04 Mar 2024 04:00 )             27.5     03-04    137  |  107  |  6<L>  ----------------------------<  83  3.8   |  23  |  0.86    Ca    8.0<L>      04 Mar 2024 04:00  Phos  2.5     03-04  Mg     2.10     03-04      PT/INR - ( 04 Mar 2024 04:00 )   PT: 14.2 sec;   INR: 1.27 ratio         PTT - ( 04 Mar 2024 04:00 )  PTT:34.6 sec      Urinalysis Basic - ( 04 Mar 2024 04:00 )    Color: x / Appearance: x / SG: x / pH: x  Gluc: 83 mg/dL / Ketone: x  / Bili: x / Urobili: x   Blood: x / Protein: x / Nitrite: x   Leuk Esterase: x / RBC: x / WBC x   Sq Epi: x / Non Sq Epi: x / Bacteria: x          RADIOLOGY & ADDITIONAL TESTS:  Results Reviewed:   Imaging Personally Reviewed:  Electrocardiogram Personally Reviewed:    COORDINATION OF CARE:  Care Discussed with Consultants/Other Providers [Y/N]:  Prior or Outpatient Records Reviewed [Y/N]:

## 2024-03-05 NOTE — PROGRESS NOTE ADULT - ASSESSMENT
This is a 43 year old male with hidradenitis suppurative (s/p skin grafts in bl axilla and nape), constipation and prior rectal bleeds who is admitted with hematochezia and acute blood loss anemia.     #Hematochezia  #Abdominal pain  #Acute blood loss anemia  #Positive CT for active congestive distal bleed within the mid descending colon  #CT enhancing mass at gluteal fold, chronic per patient    - 3/4 sp colon- findings of tumor in the descending colon (ulcerated non-obstructing, circumferential lesion was seen, oozing was present). Biopsied. Tattooed. DDx includes segmental colitis vs. malignancy. One 7 mm polyp in the sigmoid colon. Resection not attempted.      Recommendations-  - CRS input appreciated  - await path, if biopsies are unrevealing would repeat colonoscopy w/ bxs  - maintain active t&s and adequate IV access  - trend cbc, transfuse for hgb >/=7  - diet as tolerated  - rest of care as per primary team    *all recommendations are tentative until note is attested by attending*    LIZET García PA-C, RD, CDN  available on TEAMS M/T/TH/F from 7am - 4pm    after hours/weekends: non urgent issues --> email [email or web address], urgent issues --> contact on-call GI fellow at [phone]  This is a 43 year old male with hidradenitis suppurative (s/p skin grafts in bl axilla and nape), constipation and prior rectal bleeds who is admitted with hematochezia and acute blood loss anemia.     #Hematochezia  #Abdominal pain  #Acute blood loss anemia  #Positive CT for active congestive distal bleed within the mid descending colon  #CT enhancing mass at gluteal fold, chronic per patient    - 3/4 sp colon- findings of tumor in the descending colon (ulcerated non-obstructing, circumferential lesion was seen, oozing was present). Biopsied. Tattooed. DDx includes segmental colitis vs. malignancy. One 7 mm polyp in the sigmoid colon. Resection not attempted.      Recommendations-  - CRS input appreciated  - await path, if biopsies are unrevealing would repeat colonoscopy w/ bxs  - follow up MRI pelvis  - maintain active t&s and adequate IV access  - trend cbc, transfuse for hgb >/=7  - diet as tolerated  - rest of care as per primary team    *all recommendations are tentative until note is attested by attending*    LIZET García PA-C, RD, CDN  available on TEAMS M/T/TH/F from 7am - 4pm    after hours/weekends: non urgent issues --> email [email or web address], urgent issues --> contact on-call GI fellow at [phone]

## 2024-03-06 PROBLEM — Z00.00 ENCOUNTER FOR PREVENTIVE HEALTH EXAMINATION: Status: ACTIVE | Noted: 2024-03-06

## 2024-03-12 LAB — SURGICAL PATHOLOGY STUDY: SIGNIFICANT CHANGE UP

## 2024-03-13 ENCOUNTER — NON-APPOINTMENT (OUTPATIENT)
Age: 44
End: 2024-03-13

## 2024-03-14 ENCOUNTER — NON-APPOINTMENT (OUTPATIENT)
Age: 44
End: 2024-03-14

## 2024-03-18 NOTE — CHART NOTE - NSCHARTNOTEFT_GEN_A_CORE
Family member answered on behalf of the patient and advised they will pass forward our details for the patient to return our call. [phone]  [phone]
Patient is being outreached because they requested a callback  in 2 weeks waiting on biopsy results. 03/6/24

## 2024-03-25 ENCOUNTER — APPOINTMENT (OUTPATIENT)
Dept: GASTROENTEROLOGY | Facility: CLINIC | Age: 44
End: 2024-03-25
Payer: COMMERCIAL

## 2024-03-25 VITALS
SYSTOLIC BLOOD PRESSURE: 124 MMHG | HEART RATE: 95 BPM | HEIGHT: 69 IN | WEIGHT: 184 LBS | BODY MASS INDEX: 27.25 KG/M2 | OXYGEN SATURATION: 95 % | DIASTOLIC BLOOD PRESSURE: 84 MMHG | TEMPERATURE: 98.6 F | RESPIRATION RATE: 18 BRPM

## 2024-03-25 DIAGNOSIS — K62.5 HEMORRHAGE OF ANUS AND RECTUM: ICD-10-CM

## 2024-03-25 DIAGNOSIS — Z78.9 OTHER SPECIFIED HEALTH STATUS: ICD-10-CM

## 2024-03-25 DIAGNOSIS — R93.89 ABNORMAL FINDINGS ON DIAGNOSTIC IMAGING OF OTHER SPECIFIED BODY STRUCTURES: ICD-10-CM

## 2024-03-25 PROCEDURE — G2211 COMPLEX E/M VISIT ADD ON: CPT | Mod: NC,1L

## 2024-03-25 PROCEDURE — 99204 OFFICE O/P NEW MOD 45 MIN: CPT

## 2024-03-25 RX ORDER — SODIUM SULFATE, POTASSIUM SULFATE AND MAGNESIUM SULFATE 1.6; 3.13; 17.5 G/177ML; G/177ML; G/177ML
17.5-3.13-1.6 SOLUTION ORAL
Qty: 2 | Refills: 0 | Status: ACTIVE | COMMUNITY
Start: 2024-03-25 | End: 1900-01-01

## 2024-03-25 RX ORDER — ACETAMINOPHEN 325 MG/1
TABLET, FILM COATED ORAL
Refills: 0 | Status: ACTIVE | COMMUNITY

## 2024-03-25 NOTE — PHYSICAL EXAM
[Alert] : alert [Normal Voice/Communication] : normal voice/communication [Healthy Appearing] : healthy appearing [No Acute Distress] : no acute distress [Sclera] : the sclera and conjunctiva were normal [Hearing Threshold Finger Rub Not [place]] : hearing was normal [Normal Lips/Gums] : the lips and gums were normal [Oropharynx] : the oropharynx was normal [Normal Appearance] : the appearance of the neck was normal [No Neck Mass] : no neck mass was observed [No Respiratory Distress] : no respiratory distress [No Acc Muscle Use] : no accessory muscle use [Respiration, Rhythm And Depth] : normal respiratory rhythm and effort [Auscultation Breath Sounds / Voice Sounds] : lungs were clear to auscultation bilaterally [Heart Rate And Rhythm] : heart rate was normal and rhythm regular [Normal S1, S2] : normal S1 and S2 [Murmurs] : no murmurs [None] : no edema [Bowel Sounds] : normal bowel sounds [Abdomen Tenderness] : non-tender [No Masses] : no abdominal mass palpated [Abdomen Soft] : soft [Cervical Lymph Nodes Enlarged Posterior Bilaterally] : no posterior cervical lymphadenopathy [Supraclavicular Lymph Nodes Enlarged Bilaterally] : no supraclavicular lymphadenopathy [Cervical Lymph Nodes Enlarged Anterior Bilaterally] : no anterior cervical lymphadenopathy [No Spinal Tenderness] : no spinal tenderness [Abnormal Walk] : normal gait [No Clubbing, Cyanosis] : no clubbing or cyanosis of the fingernails [Normal Color / Pigmentation] : normal skin color and pigmentation [] : no rash [No Focal Deficits] : no focal deficits [Oriented To Time, Place, And Person] : oriented to person, place, and time [de-identified] : Nodular, indurated lesions with scanty discharge in bilateral gluteal areas, left side >rt side. Poor sphincter tone

## 2024-03-25 NOTE — HISTORY OF PRESENT ILLNESS
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  CT abdomen pelvis and MRI pelvis showed mass like lesion in the sigmoid colon at 40 cm and intra sphincter abscess, collection of fluid and fistula, patient describes he was having perianal fistula with discharge for the past 2 to 3 years and was seeing a dermatologist.  He was getting steroid injections into the lesion and also antibiotics on and off.  2 months ago he completed 1 month course of doxycycline .  He had colonoscopy on March 4 while in the hospital which described nonobstructing mass lesion at 40 cm, was tattooed and biopsied.  Biopsy was negative for malignancy and showed active ulcer with granulation tissue.  His hemoglobin was 8.7 g.  CMP was normal. He has history of recurring hidradenitis involving the gluteal and sacral area, for which he had surgical procedures past No family history of IBD or GI malignancy He denied regular use of alcohol NSAIDs or smoking, no recent weight loss.  His appetite has been good.  No past history of diarrhea.  However he reports having constipation and straining for the past 3 months.

## 2024-03-25 NOTE — ASSESSMENT
[FreeTextEntry1] : Bilateral perianal gluteal fistula with abnormal CT scan and MRI showing mass like lesion in the descending colon at 40 cm.  Biopsies showed active ulcer no malignancy.  Given concerning findings I advised him colonoscopy sooner to establish the diagnosis and take more biopsies throughout the colon.  He requested me to sign for  FMLA application Patient verbalized understanding and the significance

## 2024-04-12 ENCOUNTER — APPOINTMENT (OUTPATIENT)
Dept: GASTROENTEROLOGY | Facility: AMBULATORY MEDICAL SERVICES | Age: 44
End: 2024-04-12
Payer: COMMERCIAL

## 2024-04-12 PROCEDURE — 45380 COLONOSCOPY AND BIOPSY: CPT

## 2024-04-30 ENCOUNTER — APPOINTMENT (OUTPATIENT)
Dept: GASTROENTEROLOGY | Facility: CLINIC | Age: 44
End: 2024-04-30
Payer: COMMERCIAL

## 2024-04-30 VITALS
HEIGHT: 69 IN | OXYGEN SATURATION: 96 % | RESPIRATION RATE: 18 BRPM | TEMPERATURE: 98.4 F | SYSTOLIC BLOOD PRESSURE: 120 MMHG | BODY MASS INDEX: 25.77 KG/M2 | DIASTOLIC BLOOD PRESSURE: 75 MMHG | HEART RATE: 95 BPM | WEIGHT: 174 LBS

## 2024-04-30 PROCEDURE — 99214 OFFICE O/P EST MOD 30 MIN: CPT

## 2024-04-30 RX ORDER — BUDESONIDE 9 MG/1
9 TABLET, EXTENDED RELEASE ORAL
Qty: 30 | Refills: 0 | Status: ACTIVE | COMMUNITY
Start: 2024-04-30 | End: 1900-01-01

## 2024-04-30 NOTE — ASSESSMENT
[FreeTextEntry1] : Dez is a 43-year-old black male with a chronic history of hidradenitis involving the gluteal area and history of multiple surgeries the past.  CT scan of the abdomen showed mass like lesion in the sigmoid colon at colonoscopy Colonoscopy showed abnormal mucosal folds and few small ulcerations at 40 cm in the sigmoid area biopsies were described as above.  He also has anal skin tag.  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 Advised him to stop taking Motrin. Prescribed budesonide for a short trial, advised healthy diet and plenty of fluids Advised to follow-up with the dermatology, surgery and rheumatology referral was placed for arthralgias

## 2024-04-30 NOTE — PHYSICAL EXAM
[Alert] : alert [Normal Voice/Communication] : normal voice/communication [Healthy Appearing] : healthy appearing [No Acute Distress] : no acute distress [Sclera] : the sclera and conjunctiva were normal [Hearing Threshold Finger Rub Not [place]] : hearing was normal [Normal Lips/Gums] : the lips and gums were normal [Oropharynx] : the oropharynx was normal [Normal Appearance] : the appearance of the neck was normal [No Neck Mass] : no neck mass was observed [No Respiratory Distress] : no respiratory distress [No Acc Muscle Use] : no accessory muscle use [Respiration, Rhythm And Depth] : normal respiratory rhythm and effort [Auscultation Breath Sounds / Voice Sounds] : lungs were clear to auscultation bilaterally [Heart Rate And Rhythm] : heart rate was normal and rhythm regular [Normal S1, S2] : normal S1 and S2 [Murmurs] : no murmurs [None] : no edema [Bowel Sounds] : normal bowel sounds [Abdomen Tenderness] : non-tender [No Masses] : no abdominal mass palpated [Abdomen Soft] : soft [Cervical Lymph Nodes Enlarged Posterior Bilaterally] : no posterior cervical lymphadenopathy [Supraclavicular Lymph Nodes Enlarged Bilaterally] : no supraclavicular lymphadenopathy [Cervical Lymph Nodes Enlarged Anterior Bilaterally] : no anterior cervical lymphadenopathy [No Spinal Tenderness] : no spinal tenderness [Abnormal Walk] : normal gait [No Clubbing, Cyanosis] : no clubbing or cyanosis of the fingernails [Normal Color / Pigmentation] : normal skin color and pigmentation [] : no rash [No Focal Deficits] : no focal deficits [Oriented To Time, Place, And Person] : oriented to person, place, and time [de-identified] : Nodular, indurated lesions with scanty discharge in bilateral gluteal areas, left side >rt side. Poor sphincter tone

## 2024-04-30 NOTE — HISTORY OF PRESENT ILLNESS
[FreeTextEntry1] : JESSIE SPENCER 43 year Black M  came for follow up visit. He had colonoscopy and had biopsies  which showed chronic active colitis and granulation tissue from an ulcer.  Findings were not reported as ulcerative colitis or Crohn's disease Denies any NVCD or BPR.No chest pain ,cough or SOB.  He endorses pain in the gluteal area for which he has been taking Tylenol and ibuprofen 600 mg 3 times daily for the past 3 months Good appetite and no weight loss. Pathology findings discussed with him in detail and advised to avoid taking NSAIDs on a regular basis

## 2024-05-14 ENCOUNTER — LABORATORY RESULT (OUTPATIENT)
Age: 44
End: 2024-05-14

## 2024-05-14 ENCOUNTER — APPOINTMENT (OUTPATIENT)
Dept: RHEUMATOLOGY | Facility: CLINIC | Age: 44
End: 2024-05-14
Payer: COMMERCIAL

## 2024-05-14 VITALS
OXYGEN SATURATION: 100 % | HEIGHT: 69 IN | TEMPERATURE: 97.7 F | HEART RATE: 93 BPM | WEIGHT: 178 LBS | SYSTOLIC BLOOD PRESSURE: 125 MMHG | DIASTOLIC BLOOD PRESSURE: 87 MMHG | BODY MASS INDEX: 26.36 KG/M2

## 2024-05-14 DIAGNOSIS — K52.9 NONINFECTIVE GASTROENTERITIS AND COLITIS, UNSPECIFIED: ICD-10-CM

## 2024-05-14 PROCEDURE — 99205 OFFICE O/P NEW HI 60 MIN: CPT

## 2024-05-14 PROCEDURE — G2211 COMPLEX E/M VISIT ADD ON: CPT | Mod: NC,1L

## 2024-05-14 RX ORDER — OMEPRAZOLE 40 MG/1
40 CAPSULE, DELAYED RELEASE ORAL
Qty: 30 | Refills: 3 | Status: ACTIVE | COMMUNITY
Start: 2024-05-14 | End: 1900-01-01

## 2024-05-18 PROBLEM — K52.9 COLITIS: Status: ACTIVE | Noted: 2024-04-30

## 2024-05-18 LAB
ALBUMIN SERPL ELPH-MCNC: 3.8 G/DL
ALP BLD-CCNC: 140 U/L
ALT SERPL-CCNC: 7 U/L
ANION GAP SERPL CALC-SCNC: 13 MMOL/L
AST SERPL-CCNC: 9 U/L
BASOPHILS # BLD AUTO: 0.01 K/UL
BASOPHILS NFR BLD AUTO: 0.1 %
BILIRUB SERPL-MCNC: 0.3 MG/DL
BUN SERPL-MCNC: 8 MG/DL
CALCIUM SERPL-MCNC: 9.4 MG/DL
CCP AB SER IA-ACNC: <8 UNITS
CHLORIDE SERPL-SCNC: 105 MMOL/L
CO2 SERPL-SCNC: 23 MMOL/L
CREAT SERPL-MCNC: 0.73 MG/DL
CRP SERPL-MCNC: 72 MG/L
EGFR: 114 ML/MIN/1.73M2
EOSINOPHIL # BLD AUTO: 0.08 K/UL
EOSINOPHIL NFR BLD AUTO: 1.1 %
ERYTHROCYTE [SEDIMENTATION RATE] IN BLOOD BY WESTERGREN METHOD: 120 MM/HR
GLUCOSE SERPL-MCNC: 92 MG/DL
HBV CORE IGG+IGM SER QL: NONREACTIVE
HBV SURFACE AB SER QL: NONREACTIVE
HBV SURFACE AG SER QL: NONREACTIVE
HCT VFR BLD CALC: 34.8 %
HCV AB SER QL: NONREACTIVE
HCV S/CO RATIO: 0.17 S/CO
HGB BLD-MCNC: 10.1 G/DL
HLA-B27 QL NAA+PROBE: NORMAL
IMM GRANULOCYTES NFR BLD AUTO: 0.4 %
LYMPHOCYTES # BLD AUTO: 0.77 K/UL
LYMPHOCYTES NFR BLD AUTO: 10.3 %
MAN DIFF?: NORMAL
MCHC RBC-ENTMCNC: 20.5 PG
MCHC RBC-ENTMCNC: 29 GM/DL
MCV RBC AUTO: 70.7 FL
MONOCYTES # BLD AUTO: 0.74 K/UL
MONOCYTES NFR BLD AUTO: 9.9 %
NEUTROPHILS # BLD AUTO: 5.84 K/UL
NEUTROPHILS NFR BLD AUTO: 78.2 %
PLATELET # BLD AUTO: 369 K/UL
POTASSIUM SERPL-SCNC: 4 MMOL/L
PROT SERPL-MCNC: 8.2 G/DL
RBC # BLD: 4.92 M/UL
RBC # FLD: 20 %
RF+CCP IGG SER-IMP: NEGATIVE
RHEUMATOID FACT SER QL: 13 IU/ML
SODIUM SERPL-SCNC: 141 MMOL/L
WBC # FLD AUTO: 7.47 K/UL

## 2024-05-18 RX ORDER — INFLIXIMAB 100 MG/10ML
100 INJECTION, POWDER, LYOPHILIZED, FOR SOLUTION INTRAVENOUS
Qty: 10 | Refills: 0 | Status: ACTIVE | OUTPATIENT
Start: 2024-05-18

## 2024-05-18 NOTE — ASSESSMENT
[FreeTextEntry1] : 43M with HS, inflammatory eye disease and colitis with past months of large joint symmetric oligo-polyarthritis. 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  Plan: -discuss colitis diagnosis with his GI -discuss plan with dermatology and GI - would recommend Remicade infusions -recommend prednisone 40mg for acute relief of arthritis -inflammatory arthritis workup -infection screening workup -xrays of all affected joints, include SI joint. -rtc 2 weeks

## 2024-05-18 NOTE — HISTORY OF PRESENT ILLNESS
[FreeTextEntry1] : 43M coming for joint pain evaluation.  # Polyarthritis  Since 1/1024 with severe joint pain and swelling including bilateral wrists and ankles. He has been taking tylenol and Aleve with limited relief.  Denies enthesitis, dactylitis, psoriasis, inflammatory low back pain  # Hidradenitis suppurativa -Diagnosed with HS in 2010 and has had multiple procedures since (bilateral axilla grafts, occipital area), gluteal and perianal area -in 2023 on Humira but reports it did not help. They had suggested Remicade but it was not organized. -follows with [facility] (does not know name of MD)  # Rectal bleeding - Inflammatory bowel disease (?) versus NSAID-induced? Since 11/2023 with rectal bleeding. Admission in 3/2024, had colonoscopy consistent with chronic active colitis with ulcerations. Starte on budesonide which is helping.  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  Past weeks with left red eye, given steroid drops which has helped. Joint pain and swelling persists, difficult to walk. On budesonide for GI, now better.  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 No MS in family.

## 2024-05-18 NOTE — PHYSICAL EXAM
[General Appearance - Alert] : alert [General Appearance - In No Acute Distress] : in no acute distress [General Appearance - Well Nourished] : well nourished [Sclera] : the sclera and conjunctiva were normal [Sensation] : the sensory exam was normal to light touch and pinprick [Motor Exam] : the motor exam was normal [Oriented To Time, Place, And Person] : oriented to person, place, and time [FreeTextEntry1] : left side of face/mandibular area fluctuant lesion, scar occiput, axillae.

## 2024-05-18 NOTE — CONSULT LETTER
[Dear  ___] : Dear  [unfilled], [Consult Letter:] : I had the pleasure of evaluating your patient, [unfilled]. [Please see my note below.] : Please see my note below. [Consult Closing:] : Thank you very much for allowing me to participate in the care of this patient.  If you have any questions, please do not hesitate to contact me. [Sincerely,] : Sincerely, [FreeTextEntry2] : Dr. Edwin Gracia [FreeTextEntry3] :  Dr. Kath Rolle

## 2024-05-20 ENCOUNTER — NON-APPOINTMENT (OUTPATIENT)
Age: 44
End: 2024-05-20

## 2024-05-20 DIAGNOSIS — M25.50 PAIN IN UNSPECIFIED JOINT: ICD-10-CM

## 2024-05-20 LAB
M TB IFN-G BLD-IMP: NEGATIVE
QUANTIFERON TB PLUS MITOGEN MINUS NIL: 0.73 IU/ML
QUANTIFERON TB PLUS NIL: 0.04 IU/ML
QUANTIFERON TB PLUS TB1 MINUS NIL: 0 IU/ML
QUANTIFERON TB PLUS TB2 MINUS NIL: 0.01 IU/ML

## 2024-05-22 RX ORDER — METHOTREXATE 2.5 MG/1
2.5 TABLET ORAL
Qty: 20 | Refills: 1 | Status: ACTIVE | COMMUNITY
Start: 2024-05-22 | End: 1900-01-01

## 2024-05-22 RX ORDER — FOLIC ACID 1 MG/1
1 TABLET ORAL DAILY
Qty: 1 | Refills: 0 | Status: ACTIVE | COMMUNITY
Start: 2024-05-22 | End: 1900-01-01

## 2024-05-30 RX ORDER — CAMPHOR 0.45 %
25 GEL (GRAM) TOPICAL
Refills: 0 | Status: ACTIVE | OUTPATIENT
Start: 2024-05-30 | End: 1900-01-01

## 2024-05-30 RX ORDER — ACETAMINOPHEN 325 MG/1
325 TABLET, FILM COATED ORAL
Refills: 0 | Status: ACTIVE | OUTPATIENT
Start: 2024-05-30 | End: 1900-01-01

## 2024-05-31 RX ORDER — INFLIXIMAB 100 MG/10ML
100 INJECTION, POWDER, LYOPHILIZED, FOR SOLUTION INTRAVENOUS
Refills: 0 | Status: ACTIVE | OUTPATIENT
Start: 2024-05-30 | End: 1900-01-01

## 2024-06-04 ENCOUNTER — LABORATORY RESULT (OUTPATIENT)
Age: 44
End: 2024-06-04

## 2024-06-04 ENCOUNTER — APPOINTMENT (OUTPATIENT)
Dept: RHEUMATOLOGY | Facility: CLINIC | Age: 44
End: 2024-06-04
Payer: COMMERCIAL

## 2024-06-04 VITALS
TEMPERATURE: 98 F | SYSTOLIC BLOOD PRESSURE: 126 MMHG | BODY MASS INDEX: 26.96 KG/M2 | HEIGHT: 69 IN | OXYGEN SATURATION: 98 % | DIASTOLIC BLOOD PRESSURE: 89 MMHG | HEART RATE: 101 BPM | WEIGHT: 182 LBS

## 2024-06-04 DIAGNOSIS — M19.90 UNSPECIFIED OSTEOARTHRITIS, UNSPECIFIED SITE: ICD-10-CM

## 2024-06-04 DIAGNOSIS — Z79.899 OTHER LONG TERM (CURRENT) DRUG THERAPY: ICD-10-CM

## 2024-06-04 DIAGNOSIS — L73.2 HIDRADENITIS SUPPURATIVA: ICD-10-CM

## 2024-06-04 PROCEDURE — 99215 OFFICE O/P EST HI 40 MIN: CPT

## 2024-06-04 PROCEDURE — G2211 COMPLEX E/M VISIT ADD ON: CPT | Mod: NC

## 2024-06-04 RX ORDER — PREDNISONE 5 MG/1
5 TABLET ORAL
Qty: 150 | Refills: 0 | Status: ACTIVE | COMMUNITY
Start: 2024-06-04 | End: 1900-01-01

## 2024-06-04 RX ORDER — PREDNISONE 10 MG/1
10 TABLET ORAL
Qty: 200 | Refills: 1 | Status: DISCONTINUED | COMMUNITY
Start: 2024-05-14 | End: 2024-06-04

## 2024-06-04 NOTE — HISTORY OF PRESENT ILLNESS
[FreeTextEntry1] : Interval History: 6/4/2024  Feeling much better on prednisone. Resolution of joint pain. HS is unchanged .Scheduled for Remicade infusions later in June. Start MTX last Thursday 5/2024 Currently: Currently with active HS  gluteal cleft, left side of face.  Past weeks with left red eye, given steroid drops which has helped. Joint pain and swelling persists, difficult to walk. On budesonide for GI, now better.

## 2024-06-04 NOTE — PHYSICAL EXAM
[General Appearance - Alert] : alert [General Appearance - In No Acute Distress] : in no acute distress [General Appearance - Well Nourished] : well nourished [Sclera] : the sclera and conjunctiva were normal [Sensation] : the sensory exam was normal to light touch and pinprick [Motor Exam] : the motor exam was normal [Oriented To Time, Place, And Person] : oriented to person, place, and time [Musculoskeletal - Swelling] : no joint swelling seen [FreeTextEntry1] : left side of face/mandibular area fluctuant lesion, scar occiput, axillae.

## 2024-06-04 NOTE — ASSESSMENT
[FreeTextEntry1] : 43M with HS, inflammatory eye disease and colitis with past months of large joint symmetric oligo-polyarthritis. 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  Plan: -Starting Remicade later in  -cw MTX 4 tabs -continue prednisone taper -check drug toxicity labs today -urged him to go for xrays of all affected joints, include SI joint.  -GI ppx: PPI -Infection screenin2024 HBV HCV Quantiferon neg -Immunizations: COVID vaccine, COVID ab, flu vaccine, pneumonia vaccine, shingrix - discuss next visit   RTC 1month

## 2024-06-07 LAB
ALBUMIN SERPL ELPH-MCNC: 3.9 G/DL
ALP BLD-CCNC: 145 U/L
ALT SERPL-CCNC: 18 U/L
ANION GAP SERPL CALC-SCNC: 11 MMOL/L
AST SERPL-CCNC: 16 U/L
BASOPHILS # BLD AUTO: 0.01 K/UL
BASOPHILS NFR BLD AUTO: 0.1 %
BILIRUB SERPL-MCNC: 0.4 MG/DL
BUN SERPL-MCNC: 14 MG/DL
CALCIUM SERPL-MCNC: 9.1 MG/DL
CHLORIDE SERPL-SCNC: 106 MMOL/L
CO2 SERPL-SCNC: 24 MMOL/L
CREAT SERPL-MCNC: 0.96 MG/DL
CRP SERPL-MCNC: 6 MG/L
EGFR: 99 ML/MIN/1.73M2
EOSINOPHIL # BLD AUTO: 0.02 K/UL
EOSINOPHIL NFR BLD AUTO: 0.2 %
ERYTHROCYTE [SEDIMENTATION RATE] IN BLOOD BY WESTERGREN METHOD: 42 MM/HR
GLUCOSE SERPL-MCNC: 111 MG/DL
HCT VFR BLD CALC: 42.5 %
HGB BLD-MCNC: 12.1 G/DL
IMM GRANULOCYTES NFR BLD AUTO: 0.5 %
LYMPHOCYTES # BLD AUTO: 0.5 K/UL
LYMPHOCYTES NFR BLD AUTO: 3.8 %
MAN DIFF?: NORMAL
MCHC RBC-ENTMCNC: 20.9 PG
MCHC RBC-ENTMCNC: 28.5 GM/DL
MCV RBC AUTO: 73.5 FL
MONOCYTES # BLD AUTO: 0.7 K/UL
MONOCYTES NFR BLD AUTO: 5.3 %
NEUTROPHILS # BLD AUTO: 12.03 K/UL
NEUTROPHILS NFR BLD AUTO: 90.1 %
PLATELET # BLD AUTO: 247 K/UL
POTASSIUM SERPL-SCNC: 4.4 MMOL/L
PROT SERPL-MCNC: 7.5 G/DL
RBC # BLD: 5.78 M/UL
RBC # FLD: 23.3 %
SODIUM SERPL-SCNC: 140 MMOL/L
WBC # FLD AUTO: 13.32 K/UL

## 2024-06-18 ENCOUNTER — MED ADMIN CHARGE (OUTPATIENT)
Age: 44
End: 2024-06-18

## 2024-06-18 ENCOUNTER — APPOINTMENT (OUTPATIENT)
Dept: RHEUMATOLOGY | Facility: CLINIC | Age: 44
End: 2024-06-18
Payer: COMMERCIAL

## 2024-06-18 VITALS
SYSTOLIC BLOOD PRESSURE: 145 MMHG | HEART RATE: 88 BPM | DIASTOLIC BLOOD PRESSURE: 80 MMHG | OXYGEN SATURATION: 99 % | RESPIRATION RATE: 16 BRPM

## 2024-06-18 VITALS
BODY MASS INDEX: 27.02 KG/M2 | OXYGEN SATURATION: 97 % | DIASTOLIC BLOOD PRESSURE: 83 MMHG | RESPIRATION RATE: 16 BRPM | WEIGHT: 183 LBS | HEART RATE: 103 BPM | TEMPERATURE: 97.6 F | SYSTOLIC BLOOD PRESSURE: 126 MMHG

## 2024-06-18 PROCEDURE — 96413 CHEMO IV INFUSION 1 HR: CPT

## 2024-06-18 PROCEDURE — 96415 CHEMO IV INFUSION ADDL HR: CPT

## 2024-06-18 RX ORDER — CAMPHOR 0.45 %
25 GEL (GRAM) TOPICAL
Qty: 0 | Refills: 0 | Status: COMPLETED
Start: 2024-05-30

## 2024-06-18 RX ORDER — ACETAMINOPHEN 325 MG/1
325 TABLET, FILM COATED ORAL
Qty: 0 | Refills: 0 | Status: COMPLETED
Start: 2024-05-30

## 2024-06-18 RX ORDER — INFLIXIMAB 100 MG/10ML
100 INJECTION, POWDER, LYOPHILIZED, FOR SOLUTION INTRAVENOUS
Qty: 1 | Refills: 0 | Status: COMPLETED
Start: 2024-05-30

## 2024-06-18 NOTE — HISTORY OF PRESENT ILLNESS
[7] : 7 [Denies] : Denies [N/A] : N/A [Yes] : Yes [Informed consent documented in EHR.] : Informed consent documented in EHR. [de-identified] : back pain [de-identified] : left buttock [de-identified] : week 0 [22g] : 22g [Start Time: ___] : Medication Start Time: [unfilled] [End Time: ___] : Medication End Time: [unfilled] [Medication Name: ___] : Medication Name: [unfilled] [Total Amount Administered: ___] : Total Amount Administered: [unfilled] [IV discontinued. Intact. No signs or symptoms of IV complications noted. Time: ___] : IV discontinued. Intact. No signs or symptoms of IV complications noted. Time: [unfilled] [Patient  instructed to seek medical attention with signs and symptoms of adverse effects] : Patient  instructed to seek medical attention with signs and symptoms of adverse effects [Patient left unit in no acute distress] : Patient left unit in no acute distress [Medications administered as ordered and tolerated well.] : Medications administered as ordered and tolerated well. [de-identified] : left hand dorsal vein  [de-identified] : no labs [de-identified] : Patient presents for scheduled Remicade infusion, week 0, ambulatory in [facility]. Patient oriented to unit, teaching provided, and consent discussed as with Dr. Rolle. Today, patient reports pain as rated above, reports also healing lesion to his left buttock. Denies any other lesions or open wounds to skin. patient with history of skin graft to bilateral axilla and perianal area. Denies joints swelling or stiffness. Patient currently on Prednisone 20 mg daily. No other concerns or symptoms verbalized. Patient premedicated as prescribed and infusion tolerated well.

## 2024-07-02 ENCOUNTER — MED ADMIN CHARGE (OUTPATIENT)
Age: 44
End: 2024-07-02

## 2024-07-02 ENCOUNTER — APPOINTMENT (OUTPATIENT)
Dept: RHEUMATOLOGY | Facility: CLINIC | Age: 44
End: 2024-07-02
Payer: COMMERCIAL

## 2024-07-02 VITALS
DIASTOLIC BLOOD PRESSURE: 84 MMHG | SYSTOLIC BLOOD PRESSURE: 138 MMHG | TEMPERATURE: 97.9 F | HEART RATE: 97 BPM | OXYGEN SATURATION: 100 % | RESPIRATION RATE: 16 BRPM

## 2024-07-02 VITALS
RESPIRATION RATE: 16 BRPM | HEART RATE: 80 BPM | DIASTOLIC BLOOD PRESSURE: 79 MMHG | OXYGEN SATURATION: 100 % | SYSTOLIC BLOOD PRESSURE: 132 MMHG

## 2024-07-02 PROCEDURE — 96413 CHEMO IV INFUSION 1 HR: CPT

## 2024-07-02 PROCEDURE — 96415 CHEMO IV INFUSION ADDL HR: CPT

## 2024-07-02 RX ORDER — CAMPHOR 0.45 %
25 GEL (GRAM) TOPICAL
Qty: 0 | Refills: 0 | Status: COMPLETED
Start: 2024-05-30

## 2024-07-02 RX ORDER — INFLIXIMAB 100 MG/10ML
100 INJECTION, POWDER, LYOPHILIZED, FOR SOLUTION INTRAVENOUS
Qty: 1 | Refills: 0 | Status: COMPLETED
Start: 2024-05-30

## 2024-07-02 RX ORDER — ACETAMINOPHEN 325 MG/1
325 TABLET, FILM COATED ORAL
Qty: 0 | Refills: 0 | Status: COMPLETED
Start: 2024-05-30

## 2024-07-30 ENCOUNTER — MED ADMIN CHARGE (OUTPATIENT)
Age: 44
End: 2024-07-30

## 2024-07-30 ENCOUNTER — APPOINTMENT (OUTPATIENT)
Dept: RHEUMATOLOGY | Facility: CLINIC | Age: 44
End: 2024-07-30
Payer: COMMERCIAL

## 2024-07-30 VITALS
DIASTOLIC BLOOD PRESSURE: 91 MMHG | SYSTOLIC BLOOD PRESSURE: 133 MMHG | RESPIRATION RATE: 16 BRPM | OXYGEN SATURATION: 100 % | HEART RATE: 95 BPM | TEMPERATURE: 98.2 F

## 2024-07-30 VITALS — HEART RATE: 84 BPM | OXYGEN SATURATION: 100 % | DIASTOLIC BLOOD PRESSURE: 95 MMHG | SYSTOLIC BLOOD PRESSURE: 141 MMHG

## 2024-07-30 PROCEDURE — 96413 CHEMO IV INFUSION 1 HR: CPT

## 2024-07-30 PROCEDURE — 96415 CHEMO IV INFUSION ADDL HR: CPT

## 2024-07-31 RX ORDER — ACETAMINOPHEN 325 MG/1
325 TABLET ORAL
Qty: 0 | Refills: 0 | Status: COMPLETED
Start: 2024-05-30

## 2024-07-31 RX ORDER — INFLIXIMAB 100 MG/10ML
100 INJECTION, POWDER, LYOPHILIZED, FOR SOLUTION INTRAVENOUS
Qty: 1 | Refills: 0 | Status: COMPLETED
Start: 2024-05-30

## 2024-07-31 RX ORDER — CAMPHOR 0.45 %
25 GEL (GRAM) TOPICAL
Qty: 0 | Refills: 0 | Status: COMPLETED
Start: 2024-05-30

## 2024-07-31 NOTE — HISTORY OF PRESENT ILLNESS
[N/A] : N/A [Denies] : Denies [No] : No [Yes] : Yes [Left upper extremity] : Left upper extremity [24g] : 24g [Start Time: ___] : Medication Start Time: [unfilled] [End Time: ___] : Medication End Time: [unfilled] [Medication Name: ___] : Medication Name: [unfilled] [Total Amount Administered: ___] : Total Amount Administered: [unfilled] [IV discontinued. Intact. No signs or symptoms of IV complications noted. Time: ___] : IV discontinued. Intact. No signs or symptoms of IV complications noted. Time: [unfilled] [Patient  instructed to seek medical attention with signs and symptoms of adverse effects] : Patient  instructed to seek medical attention with signs and symptoms of adverse effects [Patient left unit in no acute distress] : Patient left unit in no acute distress [Medications administered as ordered and tolerated well.] : Medications administered as ordered and tolerated well. [de-identified] : left hand dorsal vein  [de-identified] : no labs [de-identified] : Patient presents for scheduled Remicade infusion, week 6, doing well overall. Patient reports tolerating last infusion well. Denies presence of lesions or open wounds to skin. Denies joints swelling or stiffness. Patient currently on Prednisone 5 mg daily. Patient noted hypertensive throughout infusion, asymptomatic, admits to occasional headaches. RN advised patient to follow up with PCP. No other concerns or symptoms verbalized. Patient premedicated as prescribed and infusion tolerated well.

## 2024-10-03 ENCOUNTER — NON-APPOINTMENT (OUTPATIENT)
Age: 44
End: 2024-10-03

## 2024-10-04 ENCOUNTER — APPOINTMENT (OUTPATIENT)
Dept: RHEUMATOLOGY | Facility: CLINIC | Age: 44
End: 2024-10-04
Payer: COMMERCIAL

## 2024-10-04 VITALS
HEART RATE: 95 BPM | TEMPERATURE: 98.2 F | SYSTOLIC BLOOD PRESSURE: 130 MMHG | OXYGEN SATURATION: 99 % | RESPIRATION RATE: 16 BRPM | DIASTOLIC BLOOD PRESSURE: 92 MMHG

## 2024-10-04 VITALS — HEART RATE: 87 BPM | SYSTOLIC BLOOD PRESSURE: 147 MMHG | DIASTOLIC BLOOD PRESSURE: 97 MMHG | OXYGEN SATURATION: 100 %

## 2024-10-04 PROCEDURE — 96413 CHEMO IV INFUSION 1 HR: CPT

## 2024-10-04 PROCEDURE — 96415 CHEMO IV INFUSION ADDL HR: CPT

## 2024-10-04 RX ORDER — ACETAMINOPHEN 325 MG/1
325 TABLET ORAL
Qty: 0 | Refills: 0 | Status: COMPLETED
Start: 2024-05-30

## 2024-10-04 RX ORDER — CAMPHOR 0.45 %
25 GEL (GRAM) TOPICAL
Qty: 0 | Refills: 0 | Status: COMPLETED
Start: 2024-05-30

## 2024-10-04 RX ORDER — INFLIXIMAB 100 MG/10ML
100 INJECTION, POWDER, LYOPHILIZED, FOR SOLUTION INTRAVENOUS
Qty: 1 | Refills: 0 | Status: COMPLETED
Start: 2024-05-30

## 2024-10-04 NOTE — HISTORY OF PRESENT ILLNESS
[4] : 4 [N/A] : N/A [Denies] : Denies [No] : No [Yes] : Yes [Declined] : Declined [de-identified] : left shoulder pain [24g] : 24g [Start Time: ___] : Medication Start Time: [unfilled] [End Time: ___] : Medication End Time: [unfilled] [Medication Name: ___] : Medication Name: [unfilled] [Total Amount Administered: ___] : Total Amount Administered: [unfilled] [IV discontinued. Intact. No signs or symptoms of IV complications noted. Time: ___] : IV discontinued. Intact. No signs or symptoms of IV complications noted. Time: [unfilled] [Patient  instructed to seek medical attention with signs and symptoms of adverse effects] : Patient  instructed to seek medical attention with signs and symptoms of adverse effects [Patient left unit in no acute distress] : Patient left unit in no acute distress [Medications administered as ordered and tolerated well.] : Medications administered as ordered and tolerated well. [de-identified] : right medial  [de-identified] : no labs [de-identified] : Patient presents for scheduled Remicade infusion, doing well overall. Patient reports tolerating last infusion well. Denies presence of lesions or open wounds to skin. Patient reports to have pain to left shoulder rated as above. Denies joints swelling or stiffness. Patient currently on Prednisone 1 mg daily. No other concerns or symptoms verbalized. Patient premedicated as prescribed and infusion tolerated well.

## 2024-10-16 ENCOUNTER — NON-APPOINTMENT (OUTPATIENT)
Age: 44
End: 2024-10-16

## 2024-11-22 ENCOUNTER — NON-APPOINTMENT (OUTPATIENT)
Age: 44
End: 2024-11-22

## 2024-12-06 ENCOUNTER — APPOINTMENT (OUTPATIENT)
Dept: RHEUMATOLOGY | Facility: CLINIC | Age: 44
End: 2024-12-06

## 2024-12-11 ENCOUNTER — APPOINTMENT (OUTPATIENT)
Dept: RHEUMATOLOGY | Facility: CLINIC | Age: 44
End: 2024-12-11
Payer: COMMERCIAL

## 2024-12-11 ENCOUNTER — MED ADMIN CHARGE (OUTPATIENT)
Age: 44
End: 2024-12-11

## 2024-12-11 VITALS
RESPIRATION RATE: 16 BRPM | SYSTOLIC BLOOD PRESSURE: 130 MMHG | DIASTOLIC BLOOD PRESSURE: 87 MMHG | OXYGEN SATURATION: 98 % | HEART RATE: 80 BPM

## 2024-12-11 VITALS
RESPIRATION RATE: 16 BRPM | OXYGEN SATURATION: 98 % | HEART RATE: 96 BPM | TEMPERATURE: 98.6 F | DIASTOLIC BLOOD PRESSURE: 88 MMHG | SYSTOLIC BLOOD PRESSURE: 126 MMHG

## 2024-12-11 DIAGNOSIS — L73.2 HIDRADENITIS SUPPURATIVA: ICD-10-CM

## 2024-12-11 DIAGNOSIS — Z79.899 OTHER LONG TERM (CURRENT) DRUG THERAPY: ICD-10-CM

## 2024-12-11 DIAGNOSIS — M19.90 UNSPECIFIED OSTEOARTHRITIS, UNSPECIFIED SITE: ICD-10-CM

## 2024-12-11 DIAGNOSIS — M25.512 PAIN IN LEFT SHOULDER: ICD-10-CM

## 2024-12-11 PROCEDURE — 36415 COLL VENOUS BLD VENIPUNCTURE: CPT

## 2024-12-11 PROCEDURE — 96413 CHEMO IV INFUSION 1 HR: CPT

## 2024-12-11 PROCEDURE — 96415 CHEMO IV INFUSION ADDL HR: CPT

## 2024-12-11 PROCEDURE — 99215 OFFICE O/P EST HI 40 MIN: CPT

## 2024-12-11 RX ORDER — ACETAMINOPHEN 325 MG/1
325 TABLET ORAL
Qty: 0 | Refills: 0 | Status: COMPLETED
Start: 2024-05-30

## 2024-12-11 RX ORDER — INFLIXIMAB 100 MG/10ML
100 INJECTION, POWDER, LYOPHILIZED, FOR SOLUTION INTRAVENOUS
Qty: 1 | Refills: 0 | Status: COMPLETED
Start: 2024-05-30

## 2024-12-11 RX ORDER — CAMPHOR 0.45 %
25 GEL (GRAM) TOPICAL
Qty: 0 | Refills: 0 | Status: COMPLETED
Start: 2024-05-30

## 2024-12-12 PROBLEM — M25.512 LEFT SHOULDER PAIN: Status: ACTIVE | Noted: 2024-12-11

## 2024-12-12 LAB
ALBUMIN SERPL ELPH-MCNC: 3.6 G/DL
ALP BLD-CCNC: 181 U/L
ALT SERPL-CCNC: 12 U/L
ANION GAP SERPL CALC-SCNC: 13 MMOL/L
AST SERPL-CCNC: 17 U/L
BASOPHILS # BLD AUTO: 0.01 K/UL
BASOPHILS NFR BLD AUTO: 0.2 %
BILIRUB SERPL-MCNC: 0.2 MG/DL
BUN SERPL-MCNC: 11 MG/DL
CALCIUM SERPL-MCNC: 8.4 MG/DL
CHLORIDE SERPL-SCNC: 107 MMOL/L
CO2 SERPL-SCNC: 22 MMOL/L
CREAT SERPL-MCNC: 0.99 MG/DL
CRP SERPL-MCNC: 36 MG/L
EGFR: 95 ML/MIN/1.73M2
EOSINOPHIL # BLD AUTO: 0.08 K/UL
EOSINOPHIL NFR BLD AUTO: 1.3 %
ERYTHROCYTE [SEDIMENTATION RATE] IN BLOOD BY WESTERGREN METHOD: 63 MM/HR
GLUCOSE SERPL-MCNC: 60 MG/DL
HCT VFR BLD CALC: 40.5 %
HGB BLD-MCNC: 12.5 G/DL
IMM GRANULOCYTES NFR BLD AUTO: 0.3 %
LYMPHOCYTES # BLD AUTO: 0.83 K/UL
LYMPHOCYTES NFR BLD AUTO: 13.5 %
MAN DIFF?: NORMAL
MCHC RBC-ENTMCNC: 24.3 PG
MCHC RBC-ENTMCNC: 30.9 G/DL
MCV RBC AUTO: 78.6 FL
MONOCYTES # BLD AUTO: 0.81 K/UL
MONOCYTES NFR BLD AUTO: 13.2 %
NEUTROPHILS # BLD AUTO: 4.38 K/UL
NEUTROPHILS NFR BLD AUTO: 71.5 %
PLATELET # BLD AUTO: 213 K/UL
POTASSIUM SERPL-SCNC: 4.5 MMOL/L
PROT SERPL-MCNC: 7.2 G/DL
RBC # BLD: 5.15 M/UL
RBC # FLD: 17.2 %
SODIUM SERPL-SCNC: 143 MMOL/L
WBC # FLD AUTO: 6.13 K/UL

## 2025-02-06 ENCOUNTER — APPOINTMENT (OUTPATIENT)
Dept: RHEUMATOLOGY | Facility: CLINIC | Age: 45
End: 2025-02-06
Payer: COMMERCIAL

## 2025-02-06 ENCOUNTER — MED ADMIN CHARGE (OUTPATIENT)
Age: 45
End: 2025-02-06

## 2025-02-06 VITALS
SYSTOLIC BLOOD PRESSURE: 141 MMHG | HEART RATE: 92 BPM | TEMPERATURE: 98 F | RESPIRATION RATE: 16 BRPM | OXYGEN SATURATION: 98 % | DIASTOLIC BLOOD PRESSURE: 86 MMHG

## 2025-02-06 VITALS — SYSTOLIC BLOOD PRESSURE: 135 MMHG | DIASTOLIC BLOOD PRESSURE: 90 MMHG | HEART RATE: 81 BPM

## 2025-02-06 PROCEDURE — 36415 COLL VENOUS BLD VENIPUNCTURE: CPT

## 2025-02-06 PROCEDURE — 96413 CHEMO IV INFUSION 1 HR: CPT

## 2025-02-06 PROCEDURE — 96415 CHEMO IV INFUSION ADDL HR: CPT

## 2025-02-06 RX ORDER — CAMPHOR 0.45 %
25 GEL (GRAM) TOPICAL
Qty: 0 | Refills: 0 | Status: COMPLETED
Start: 2024-05-30

## 2025-02-06 RX ORDER — INFLIXIMAB 100 MG/10ML
100 INJECTION, POWDER, LYOPHILIZED, FOR SOLUTION INTRAVENOUS
Qty: 1 | Refills: 0 | Status: COMPLETED
Start: 2024-05-30

## 2025-02-06 RX ORDER — ACETAMINOPHEN 325 MG/1
325 TABLET ORAL
Qty: 0 | Refills: 0 | Status: COMPLETED
Start: 2024-05-30

## 2025-02-07 LAB
ALBUMIN SERPL ELPH-MCNC: 3.6 G/DL
ALP BLD-CCNC: 189 U/L
ALT SERPL-CCNC: 12 U/L
ANION GAP SERPL CALC-SCNC: 11 MMOL/L
AST SERPL-CCNC: 18 U/L
BASOPHILS # BLD AUTO: 0.01 K/UL
BASOPHILS NFR BLD AUTO: 0.2 %
BILIRUB SERPL-MCNC: 0.4 MG/DL
BUN SERPL-MCNC: 8 MG/DL
CALCIUM SERPL-MCNC: 8.7 MG/DL
CHLORIDE SERPL-SCNC: 107 MMOL/L
CO2 SERPL-SCNC: 23 MMOL/L
CREAT SERPL-MCNC: 0.84 MG/DL
CRP SERPL-MCNC: 27 MG/L
EGFR: 109 ML/MIN/1.73M2
EOSINOPHIL # BLD AUTO: 0.1 K/UL
EOSINOPHIL NFR BLD AUTO: 1.9 %
ERYTHROCYTE [SEDIMENTATION RATE] IN BLOOD BY WESTERGREN METHOD: 77 MM/HR
GLUCOSE SERPL-MCNC: 90 MG/DL
HCT VFR BLD CALC: 40.9 %
HGB BLD-MCNC: 12.5 G/DL
IMM GRANULOCYTES NFR BLD AUTO: 0.2 %
LYMPHOCYTES # BLD AUTO: 0.97 K/UL
LYMPHOCYTES NFR BLD AUTO: 18 %
MAN DIFF?: NORMAL
MCHC RBC-ENTMCNC: 23.5 PG
MCHC RBC-ENTMCNC: 30.6 G/DL
MCV RBC AUTO: 76.9 FL
MONOCYTES # BLD AUTO: 0.65 K/UL
MONOCYTES NFR BLD AUTO: 12.1 %
NEUTROPHILS # BLD AUTO: 3.64 K/UL
NEUTROPHILS NFR BLD AUTO: 67.6 %
PLATELET # BLD AUTO: 245 K/UL
POTASSIUM SERPL-SCNC: 4.3 MMOL/L
PROT SERPL-MCNC: 7.5 G/DL
RBC # BLD: 5.32 M/UL
RBC # FLD: 18.5 %
SODIUM SERPL-SCNC: 141 MMOL/L
WBC # FLD AUTO: 5.38 K/UL

## 2025-04-03 ENCOUNTER — APPOINTMENT (OUTPATIENT)
Dept: RHEUMATOLOGY | Facility: CLINIC | Age: 45
End: 2025-04-03
Payer: COMMERCIAL

## 2025-04-03 VITALS
DIASTOLIC BLOOD PRESSURE: 92 MMHG | HEART RATE: 98 BPM | TEMPERATURE: 98 F | OXYGEN SATURATION: 98 % | SYSTOLIC BLOOD PRESSURE: 141 MMHG | RESPIRATION RATE: 16 BRPM

## 2025-04-03 VITALS — DIASTOLIC BLOOD PRESSURE: 86 MMHG | HEART RATE: 80 BPM | OXYGEN SATURATION: 99 % | SYSTOLIC BLOOD PRESSURE: 129 MMHG

## 2025-04-03 PROCEDURE — 96413 CHEMO IV INFUSION 1 HR: CPT

## 2025-04-03 PROCEDURE — 36415 COLL VENOUS BLD VENIPUNCTURE: CPT

## 2025-04-03 PROCEDURE — 96415 CHEMO IV INFUSION ADDL HR: CPT

## 2025-04-03 RX ORDER — ACETAMINOPHEN 325 MG/1
325 TABLET ORAL
Qty: 0 | Refills: 0 | Status: COMPLETED
Start: 2024-05-30

## 2025-04-03 RX ORDER — INFLIXIMAB 100 MG/10ML
100 INJECTION, POWDER, LYOPHILIZED, FOR SOLUTION INTRAVENOUS
Qty: 1 | Refills: 0 | Status: COMPLETED
Start: 2024-05-30

## 2025-04-03 RX ORDER — CAMPHOR 0.45 %
25 GEL (GRAM) TOPICAL
Qty: 0 | Refills: 0 | Status: COMPLETED
Start: 2024-05-30

## 2025-04-04 LAB
ALBUMIN SERPL ELPH-MCNC: 3.9 G/DL
ALP BLD-CCNC: 204 U/L
ALT SERPL-CCNC: 16 U/L
ANION GAP SERPL CALC-SCNC: 15 MMOL/L
AST SERPL-CCNC: 23 U/L
BASOPHILS # BLD AUTO: 0.02 K/UL
BASOPHILS NFR BLD AUTO: 0.3 %
BILIRUB SERPL-MCNC: 0.3 MG/DL
BUN SERPL-MCNC: 11 MG/DL
CALCIUM SERPL-MCNC: 8.5 MG/DL
CHLORIDE SERPL-SCNC: 105 MMOL/L
CO2 SERPL-SCNC: 21 MMOL/L
CREAT SERPL-MCNC: 0.88 MG/DL
CRP SERPL-MCNC: 41 MG/L
EGFRCR SERPLBLD CKD-EPI 2021: 107 ML/MIN/1.73M2
EOSINOPHIL # BLD AUTO: 0.1 K/UL
EOSINOPHIL NFR BLD AUTO: 1.5 %
ERYTHROCYTE [SEDIMENTATION RATE] IN BLOOD BY WESTERGREN METHOD: 73 MM/HR
GLUCOSE SERPL-MCNC: 91 MG/DL
HCT VFR BLD CALC: 41.9 %
HGB BLD-MCNC: 12.7 G/DL
IMM GRANULOCYTES NFR BLD AUTO: 0.1 %
LYMPHOCYTES # BLD AUTO: 0.94 K/UL
LYMPHOCYTES NFR BLD AUTO: 13.8 %
MAN DIFF?: NORMAL
MCHC RBC-ENTMCNC: 23.2 PG
MCHC RBC-ENTMCNC: 30.3 G/DL
MCV RBC AUTO: 76.6 FL
MONOCYTES # BLD AUTO: 0.63 K/UL
MONOCYTES NFR BLD AUTO: 9.2 %
NEUTROPHILS # BLD AUTO: 5.13 K/UL
NEUTROPHILS NFR BLD AUTO: 75.1 %
PLATELET # BLD AUTO: 250 K/UL
POTASSIUM SERPL-SCNC: 4.8 MMOL/L
PROT SERPL-MCNC: 7.7 G/DL
RBC # BLD: 5.47 M/UL
RBC # FLD: 18.8 %
SODIUM SERPL-SCNC: 141 MMOL/L
WBC # FLD AUTO: 6.83 K/UL

## 2025-04-11 ENCOUNTER — NON-APPOINTMENT (OUTPATIENT)
Age: 45
End: 2025-04-11

## 2025-04-29 ENCOUNTER — OUTPATIENT (OUTPATIENT)
Dept: OUTPATIENT SERVICES | Facility: HOSPITAL | Age: 45
LOS: 1 days | End: 2025-04-29
Payer: COMMERCIAL

## 2025-04-29 ENCOUNTER — APPOINTMENT (OUTPATIENT)
Dept: RADIOLOGY | Facility: CLINIC | Age: 45
End: 2025-04-29
Payer: COMMERCIAL

## 2025-04-29 ENCOUNTER — APPOINTMENT (OUTPATIENT)
Dept: RHEUMATOLOGY | Facility: CLINIC | Age: 45
End: 2025-04-29
Payer: COMMERCIAL

## 2025-04-29 VITALS
HEART RATE: 92 BPM | BODY MASS INDEX: 29.92 KG/M2 | DIASTOLIC BLOOD PRESSURE: 92 MMHG | OXYGEN SATURATION: 96 % | SYSTOLIC BLOOD PRESSURE: 131 MMHG | HEIGHT: 69 IN | WEIGHT: 202 LBS

## 2025-04-29 DIAGNOSIS — M25.512 PAIN IN LEFT SHOULDER: ICD-10-CM

## 2025-04-29 DIAGNOSIS — L73.2 HIDRADENITIS SUPPURATIVA: ICD-10-CM

## 2025-04-29 DIAGNOSIS — M19.90 UNSPECIFIED OSTEOARTHRITIS, UNSPECIFIED SITE: ICD-10-CM

## 2025-04-29 DIAGNOSIS — M25.50 PAIN IN UNSPECIFIED JOINT: ICD-10-CM

## 2025-04-29 DIAGNOSIS — Z79.899 OTHER LONG TERM (CURRENT) DRUG THERAPY: ICD-10-CM

## 2025-04-29 PROCEDURE — 72202 X-RAY EXAM SI JOINTS 3/> VWS: CPT | Mod: 26

## 2025-04-29 PROCEDURE — 73030 X-RAY EXAM OF SHOULDER: CPT

## 2025-04-29 PROCEDURE — 99215 OFFICE O/P EST HI 40 MIN: CPT

## 2025-04-29 PROCEDURE — 73030 X-RAY EXAM OF SHOULDER: CPT | Mod: 26,50

## 2025-04-29 PROCEDURE — 72202 X-RAY EXAM SI JOINTS 3/> VWS: CPT

## 2025-04-29 PROCEDURE — G2211 COMPLEX E/M VISIT ADD ON: CPT | Mod: NC

## 2025-04-30 RX ADMIN — INFLIXIMAB 0 MG: 100 INJECTION, POWDER, LYOPHILIZED, FOR SOLUTION INTRAVENOUS at 00:00

## 2025-05-27 DIAGNOSIS — Z79.899 OTHER LONG TERM (CURRENT) DRUG THERAPY: ICD-10-CM

## 2025-05-29 ENCOUNTER — MED ADMIN CHARGE (OUTPATIENT)
Age: 45
End: 2025-05-29

## 2025-05-29 ENCOUNTER — APPOINTMENT (OUTPATIENT)
Dept: RHEUMATOLOGY | Facility: CLINIC | Age: 45
End: 2025-05-29
Payer: COMMERCIAL

## 2025-05-29 VITALS
OXYGEN SATURATION: 100 % | RESPIRATION RATE: 16 BRPM | HEART RATE: 81 BPM | SYSTOLIC BLOOD PRESSURE: 127 MMHG | DIASTOLIC BLOOD PRESSURE: 84 MMHG | TEMPERATURE: 98 F

## 2025-05-29 VITALS — HEART RATE: 85 BPM | SYSTOLIC BLOOD PRESSURE: 127 MMHG | OXYGEN SATURATION: 100 % | DIASTOLIC BLOOD PRESSURE: 86 MMHG

## 2025-05-29 PROCEDURE — 96415 CHEMO IV INFUSION ADDL HR: CPT

## 2025-05-29 PROCEDURE — 36415 COLL VENOUS BLD VENIPUNCTURE: CPT

## 2025-05-29 PROCEDURE — 96413 CHEMO IV INFUSION 1 HR: CPT

## 2025-05-29 RX ORDER — CAMPHOR 0.45 %
25 GEL (GRAM) TOPICAL
Qty: 0 | Refills: 0 | Status: COMPLETED
Start: 2024-05-30

## 2025-05-29 RX ORDER — ACETAMINOPHEN 325 MG/1
325 TABLET ORAL
Qty: 0 | Refills: 0 | Status: COMPLETED
Start: 2024-05-30

## 2025-06-02 LAB
ALBUMIN SERPL ELPH-MCNC: 4 G/DL
ALP BLD-CCNC: 204 U/L
ALT SERPL-CCNC: 22 U/L
ANION GAP SERPL CALC-SCNC: 13 MMOL/L
AST SERPL-CCNC: 26 U/L
BASOPHILS # BLD AUTO: 0.01 K/UL
BASOPHILS NFR BLD AUTO: 0.2 %
BILIRUB SERPL-MCNC: 0.4 MG/DL
BUN SERPL-MCNC: 9 MG/DL
CALCIUM SERPL-MCNC: 8.9 MG/DL
CHLORIDE SERPL-SCNC: 103 MMOL/L
CO2 SERPL-SCNC: 24 MMOL/L
CREAT SERPL-MCNC: 0.97 MG/DL
CRP SERPL-MCNC: 21 MG/L
EGFRCR SERPLBLD CKD-EPI 2021: 98 ML/MIN/1.73M2
EOSINOPHIL # BLD AUTO: 0.09 K/UL
EOSINOPHIL NFR BLD AUTO: 1.5 %
ERYTHROCYTE [SEDIMENTATION RATE] IN BLOOD BY WESTERGREN METHOD: 81 MM/HR
GLUCOSE SERPL-MCNC: 55 MG/DL
HBV CORE IGG+IGM SER QL: NONREACTIVE
HBV CORE IGM SER QL: NONREACTIVE
HBV SURFACE AB SER QL: NONREACTIVE
HBV SURFACE AG SER QL: NONREACTIVE
HCT VFR BLD CALC: 42.2 %
HCV AB SER QL: NONREACTIVE
HCV S/CO RATIO: 0.23 S/CO
HGB BLD-MCNC: 12.8 G/DL
IMM GRANULOCYTES NFR BLD AUTO: 0.2 %
LYMPHOCYTES # BLD AUTO: 1.1 K/UL
LYMPHOCYTES NFR BLD AUTO: 18.2 %
M TB IFN-G BLD-IMP: NEGATIVE
MAN DIFF?: NORMAL
MCHC RBC-ENTMCNC: 23.6 PG
MCHC RBC-ENTMCNC: 30.3 G/DL
MCV RBC AUTO: 77.7 FL
MONOCYTES # BLD AUTO: 0.55 K/UL
MONOCYTES NFR BLD AUTO: 9.1 %
NEUTROPHILS # BLD AUTO: 4.29 K/UL
NEUTROPHILS NFR BLD AUTO: 70.8 %
PLATELET # BLD AUTO: 232 K/UL
POTASSIUM SERPL-SCNC: 4.8 MMOL/L
PROT SERPL-MCNC: 7.9 G/DL
QUANTIFERON TB PLUS MITOGEN MINUS NIL: 1.38 IU/ML
QUANTIFERON TB PLUS NIL: 0.03 IU/ML
QUANTIFERON TB PLUS TB1 MINUS NIL: 0 IU/ML
QUANTIFERON TB PLUS TB2 MINUS NIL: 0 IU/ML
RBC # BLD: 5.43 M/UL
RBC # FLD: 18.2 %
SODIUM SERPL-SCNC: 139 MMOL/L
WBC # FLD AUTO: 6.05 K/UL

## 2025-07-23 ENCOUNTER — APPOINTMENT (OUTPATIENT)
Dept: RHEUMATOLOGY | Facility: CLINIC | Age: 45
End: 2025-07-23
Payer: COMMERCIAL

## 2025-07-23 VITALS
SYSTOLIC BLOOD PRESSURE: 137 MMHG | OXYGEN SATURATION: 99 % | TEMPERATURE: 98 F | DIASTOLIC BLOOD PRESSURE: 85 MMHG | RESPIRATION RATE: 16 BRPM | HEART RATE: 88 BPM

## 2025-07-23 VITALS
OXYGEN SATURATION: 99 % | RESPIRATION RATE: 16 BRPM | DIASTOLIC BLOOD PRESSURE: 89 MMHG | SYSTOLIC BLOOD PRESSURE: 128 MMHG | BODY MASS INDEX: 29.62 KG/M2 | HEIGHT: 69 IN | HEART RATE: 91 BPM | WEIGHT: 200 LBS

## 2025-07-23 VITALS — DIASTOLIC BLOOD PRESSURE: 92 MMHG | OXYGEN SATURATION: 97 % | HEART RATE: 80 BPM | SYSTOLIC BLOOD PRESSURE: 143 MMHG

## 2025-07-23 DIAGNOSIS — Z79.899 OTHER LONG TERM (CURRENT) DRUG THERAPY: ICD-10-CM

## 2025-07-23 DIAGNOSIS — M19.90 UNSPECIFIED OSTEOARTHRITIS, UNSPECIFIED SITE: ICD-10-CM

## 2025-07-23 DIAGNOSIS — M75.22 BICIPITAL TENDINITIS, LEFT SHOULDER: ICD-10-CM

## 2025-07-23 DIAGNOSIS — L73.2 HIDRADENITIS SUPPURATIVA: ICD-10-CM

## 2025-07-23 PROCEDURE — 36415 COLL VENOUS BLD VENIPUNCTURE: CPT

## 2025-07-23 PROCEDURE — 99215 OFFICE O/P EST HI 40 MIN: CPT | Mod: 25

## 2025-07-23 PROCEDURE — 96415 CHEMO IV INFUSION ADDL HR: CPT

## 2025-07-23 PROCEDURE — ZZZZZ: CPT

## 2025-07-23 PROCEDURE — 96413 CHEMO IV INFUSION 1 HR: CPT

## 2025-07-23 RX ORDER — ACETAMINOPHEN 325 MG/1
325 TABLET ORAL
Qty: 0 | Refills: 0 | Status: COMPLETED
Start: 2024-05-30

## 2025-07-23 RX ORDER — CAMPHOR 0.45 %
25 GEL (GRAM) TOPICAL
Qty: 0 | Refills: 0 | Status: COMPLETED
Start: 2024-05-30

## 2025-07-23 RX ORDER — INFLIXIMAB 100 MG/10ML
100 INJECTION, POWDER, LYOPHILIZED, FOR SOLUTION INTRAVENOUS
Qty: 1 | Refills: 0 | Status: COMPLETED
Start: 2025-07-23

## 2025-07-23 RX ORDER — INFLIXIMAB 100 MG/10ML
100 INJECTION, POWDER, LYOPHILIZED, FOR SOLUTION INTRAVENOUS
Qty: 1 | Refills: 0 | Status: ACTIVE | OUTPATIENT
Start: 2025-07-23 | End: 1900-01-01

## 2025-07-23 RX ADMIN — INFLIXIMAB 0 MG: 100 INJECTION, POWDER, LYOPHILIZED, FOR SOLUTION INTRAVENOUS at 00:00

## 2025-07-24 ENCOUNTER — APPOINTMENT (OUTPATIENT)
Dept: RHEUMATOLOGY | Facility: CLINIC | Age: 45
End: 2025-07-24

## 2025-07-31 LAB
ALBUMIN SERPL ELPH-MCNC: 4.1 G/DL
ALP BLD-CCNC: 190 U/L
ALT SERPL-CCNC: 20 U/L
ANION GAP SERPL CALC-SCNC: 14 MMOL/L
AST SERPL-CCNC: 28 U/L
BASOPHILS # BLD AUTO: 0.02 K/UL
BASOPHILS NFR BLD AUTO: 0.4 %
BILIRUB SERPL-MCNC: 0.5 MG/DL
BUN SERPL-MCNC: 11 MG/DL
CALCIUM SERPL-MCNC: 8.9 MG/DL
CHLORIDE SERPL-SCNC: 105 MMOL/L
CO2 SERPL-SCNC: 21 MMOL/L
CREAT SERPL-MCNC: 1 MG/DL
CRP SERPL-MCNC: 15 MG/L
EGFRCR SERPLBLD CKD-EPI 2021: 93 ML/MIN/1.73M2
EOSINOPHIL # BLD AUTO: 0.1 K/UL
EOSINOPHIL NFR BLD AUTO: 1.9 %
ERYTHROCYTE [SEDIMENTATION RATE] IN BLOOD BY WESTERGREN METHOD: 59 MM/HR
GLUCOSE SERPL-MCNC: 80 MG/DL
HCT VFR BLD CALC: 44.1 %
HGB BLD-MCNC: 13.5 G/DL
IMM GRANULOCYTES NFR BLD AUTO: 0.2 %
LYMPHOCYTES # BLD AUTO: 1.07 K/UL
LYMPHOCYTES NFR BLD AUTO: 20.7 %
MAN DIFF?: NORMAL
MCHC RBC-ENTMCNC: 23.8 PG
MCHC RBC-ENTMCNC: 30.6 G/DL
MCV RBC AUTO: 77.6 FL
MONOCYTES # BLD AUTO: 0.59 K/UL
MONOCYTES NFR BLD AUTO: 11.4 %
NEUTROPHILS # BLD AUTO: 3.39 K/UL
NEUTROPHILS NFR BLD AUTO: 65.4 %
PLATELET # BLD AUTO: 226 K/UL
POTASSIUM SERPL-SCNC: 4.8 MMOL/L
PROT SERPL-MCNC: 7.9 G/DL
RBC # BLD: 5.68 M/UL
RBC # FLD: 20 %
SODIUM SERPL-SCNC: 139 MMOL/L
WBC # FLD AUTO: 5.18 K/UL

## 2025-08-02 ENCOUNTER — OUTPATIENT (OUTPATIENT)
Dept: OUTPATIENT SERVICES | Facility: HOSPITAL | Age: 45
LOS: 1 days | End: 2025-08-02
Payer: COMMERCIAL

## 2025-08-02 ENCOUNTER — APPOINTMENT (OUTPATIENT)
Dept: MRI IMAGING | Facility: IMAGING CENTER | Age: 45
End: 2025-08-02
Payer: COMMERCIAL

## 2025-08-02 DIAGNOSIS — Z00.8 ENCOUNTER FOR OTHER GENERAL EXAMINATION: ICD-10-CM

## 2025-08-02 DIAGNOSIS — M25.512 PAIN IN LEFT SHOULDER: ICD-10-CM

## 2025-08-02 PROCEDURE — 73221 MRI JOINT UPR EXTREM W/O DYE: CPT | Mod: 26,LT

## 2025-08-02 PROCEDURE — 73221 MRI JOINT UPR EXTREM W/O DYE: CPT

## 2025-08-06 DIAGNOSIS — M19.012 PRIMARY OSTEOARTHRITIS, LEFT SHOULDER: ICD-10-CM

## 2025-08-06 DIAGNOSIS — M67.814 OTHER SPECIFIED DISORDERS OF TENDON, LEFT SHOULDER: ICD-10-CM

## 2025-08-15 ENCOUNTER — APPOINTMENT (OUTPATIENT)
Dept: ORTHOPEDIC SURGERY | Facility: CLINIC | Age: 45
End: 2025-08-15
Payer: COMMERCIAL

## 2025-08-15 VITALS
DIASTOLIC BLOOD PRESSURE: 93 MMHG | HEIGHT: 69 IN | BODY MASS INDEX: 29.62 KG/M2 | HEART RATE: 89 BPM | SYSTOLIC BLOOD PRESSURE: 137 MMHG | WEIGHT: 200 LBS

## 2025-08-15 DIAGNOSIS — M25.512 PAIN IN LEFT SHOULDER: ICD-10-CM

## 2025-08-15 PROCEDURE — 99204 OFFICE O/P NEW MOD 45 MIN: CPT

## 2025-09-18 ENCOUNTER — APPOINTMENT (OUTPATIENT)
Dept: RHEUMATOLOGY | Facility: CLINIC | Age: 45
End: 2025-09-18

## (undated) DEVICE — CONTAINER FORMALIN 80ML YELLOW

## (undated) DEVICE — ELCTR ECG CONDUCTIVE ADHESIVE

## (undated) DEVICE — BIOPSY FORCEP RADIAL JAW 4 STANDARD WITH NEEDLE

## (undated) DEVICE — DRSG CURITY GAUZE SPONGE 4 X 4" 12-PLY NON-STERILE

## (undated) DEVICE — BIOPSY FORCEP COLD DISP

## (undated) DEVICE — SALIVA EJECTOR (BLUE)

## (undated) DEVICE — LUBRICATING JELLY HR ONE SHOT 3G

## (undated) DEVICE — ATTACHMENT DISTAL 4X15MM

## (undated) DEVICE — ELCTR GROUNDING PAD ADULT COVIDIEN

## (undated) DEVICE — TUBING MEDI-VAC W MAXIGRIP CONNECTORS 1/4"X6'

## (undated) DEVICE — PACK IV START WITH CHG

## (undated) DEVICE — TUBING IV SET GRAVITY 3Y 100" MACRO

## (undated) DEVICE — TUBING SUCTION NONCONDUCTIVE 6MM X 12FT

## (undated) DEVICE — CATH IV SAFE BC 22G X 1" (BLUE)

## (undated) DEVICE — BASIN EMESIS 10IN GRADUATED MAUVE

## (undated) DEVICE — DRSG 2X2

## (undated) DEVICE — GOWN LG

## (undated) DEVICE — DRSG BANDAID 0.75X3"